# Patient Record
Sex: MALE | Race: WHITE | NOT HISPANIC OR LATINO | URBAN - METROPOLITAN AREA
[De-identification: names, ages, dates, MRNs, and addresses within clinical notes are randomized per-mention and may not be internally consistent; named-entity substitution may affect disease eponyms.]

---

## 2018-05-07 PROBLEM — Z00.00 ENCOUNTER FOR PREVENTIVE HEALTH EXAMINATION: Status: ACTIVE | Noted: 2018-05-07

## 2019-01-15 ENCOUNTER — EMERGENCY (EMERGENCY)
Facility: HOSPITAL | Age: 39
LOS: 1 days | Discharge: HOME | End: 2019-01-15
Attending: EMERGENCY MEDICINE
Payer: COMMERCIAL

## 2019-01-15 VITALS
TEMPERATURE: 98 F | RESPIRATION RATE: 18 BRPM | DIASTOLIC BLOOD PRESSURE: 60 MMHG | SYSTOLIC BLOOD PRESSURE: 112 MMHG | HEART RATE: 62 BPM | OXYGEN SATURATION: 100 %

## 2019-01-15 VITALS
TEMPERATURE: 98 F | WEIGHT: 259.93 LBS | HEART RATE: 73 BPM | DIASTOLIC BLOOD PRESSURE: 89 MMHG | OXYGEN SATURATION: 99 % | RESPIRATION RATE: 18 BRPM | SYSTOLIC BLOOD PRESSURE: 135 MMHG | HEIGHT: 74 IN

## 2019-01-15 LAB
ALBUMIN SERPL ELPH-MCNC: 4.8 G/DL — SIGNIFICANT CHANGE UP (ref 3.3–5)
ALP SERPL-CCNC: 66 U/L — SIGNIFICANT CHANGE UP (ref 40–120)
ALT FLD-CCNC: 44 U/L — SIGNIFICANT CHANGE UP (ref 10–45)
ANION GAP SERPL CALC-SCNC: 14 MMOL/L — SIGNIFICANT CHANGE UP (ref 5–17)
APTT BLD: 31.9 SEC — SIGNIFICANT CHANGE UP (ref 27.5–36.3)
AST SERPL-CCNC: 26 U/L — SIGNIFICANT CHANGE UP (ref 10–40)
BASOPHILS # BLD AUTO: 0.1 K/UL — SIGNIFICANT CHANGE UP (ref 0–0.2)
BASOPHILS # BLD AUTO: 0.1 K/UL — SIGNIFICANT CHANGE UP (ref 0–0.2)
BASOPHILS NFR BLD AUTO: 0.7 % — SIGNIFICANT CHANGE UP (ref 0–2)
BASOPHILS NFR BLD AUTO: 0.9 % — SIGNIFICANT CHANGE UP (ref 0–2)
BILIRUB SERPL-MCNC: 0.3 MG/DL — SIGNIFICANT CHANGE UP (ref 0.2–1.2)
BUN SERPL-MCNC: 14 MG/DL — SIGNIFICANT CHANGE UP (ref 7–23)
CALCIUM SERPL-MCNC: 9.9 MG/DL — SIGNIFICANT CHANGE UP (ref 8.4–10.5)
CHLORIDE SERPL-SCNC: 97 MMOL/L — SIGNIFICANT CHANGE UP (ref 96–108)
CO2 SERPL-SCNC: 26 MMOL/L — SIGNIFICANT CHANGE UP (ref 22–31)
CREAT SERPL-MCNC: 0.98 MG/DL — SIGNIFICANT CHANGE UP (ref 0.5–1.3)
EOSINOPHIL # BLD AUTO: 0.2 K/UL — SIGNIFICANT CHANGE UP (ref 0–0.5)
EOSINOPHIL # BLD AUTO: 0.2 K/UL — SIGNIFICANT CHANGE UP (ref 0–0.5)
EOSINOPHIL NFR BLD AUTO: 2 % — SIGNIFICANT CHANGE UP (ref 0–6)
EOSINOPHIL NFR BLD AUTO: 2 % — SIGNIFICANT CHANGE UP (ref 0–6)
GLUCOSE SERPL-MCNC: 118 MG/DL — HIGH (ref 70–99)
HCT VFR BLD CALC: 47.7 % — SIGNIFICANT CHANGE UP (ref 39–50)
HCT VFR BLD CALC: 48.1 % — SIGNIFICANT CHANGE UP (ref 39–50)
HGB BLD-MCNC: 16.5 G/DL — SIGNIFICANT CHANGE UP (ref 13–17)
HGB BLD-MCNC: 16.5 G/DL — SIGNIFICANT CHANGE UP (ref 13–17)
INR BLD: 0.97 RATIO — SIGNIFICANT CHANGE UP (ref 0.88–1.16)
LYMPHOCYTES # BLD AUTO: 2.6 K/UL — SIGNIFICANT CHANGE UP (ref 1–3.3)
LYMPHOCYTES # BLD AUTO: 23.3 % — SIGNIFICANT CHANGE UP (ref 13–44)
LYMPHOCYTES # BLD AUTO: 26.5 % — SIGNIFICANT CHANGE UP (ref 13–44)
LYMPHOCYTES # BLD AUTO: 3 K/UL — SIGNIFICANT CHANGE UP (ref 1–3.3)
MCHC RBC-ENTMCNC: 31.1 PG — SIGNIFICANT CHANGE UP (ref 27–34)
MCHC RBC-ENTMCNC: 31.2 PG — SIGNIFICANT CHANGE UP (ref 27–34)
MCHC RBC-ENTMCNC: 34.4 GM/DL — SIGNIFICANT CHANGE UP (ref 32–36)
MCHC RBC-ENTMCNC: 34.5 GM/DL — SIGNIFICANT CHANGE UP (ref 32–36)
MCV RBC AUTO: 90.2 FL — SIGNIFICANT CHANGE UP (ref 80–100)
MCV RBC AUTO: 90.4 FL — SIGNIFICANT CHANGE UP (ref 80–100)
MONOCYTES # BLD AUTO: 0.7 K/UL — SIGNIFICANT CHANGE UP (ref 0–0.9)
MONOCYTES # BLD AUTO: 0.8 K/UL — SIGNIFICANT CHANGE UP (ref 0–0.9)
MONOCYTES NFR BLD AUTO: 6 % — SIGNIFICANT CHANGE UP (ref 2–14)
MONOCYTES NFR BLD AUTO: 6.7 % — SIGNIFICANT CHANGE UP (ref 2–14)
NEUTROPHILS # BLD AUTO: 7.2 K/UL — SIGNIFICANT CHANGE UP (ref 1.8–7.4)
NEUTROPHILS # BLD AUTO: 7.7 K/UL — HIGH (ref 1.8–7.4)
NEUTROPHILS NFR BLD AUTO: 63.9 % — SIGNIFICANT CHANGE UP (ref 43–77)
NEUTROPHILS NFR BLD AUTO: 68 % — SIGNIFICANT CHANGE UP (ref 43–77)
PLATELET # BLD AUTO: 170 K/UL — SIGNIFICANT CHANGE UP (ref 150–400)
PLATELET # BLD AUTO: ABNORMAL (ref 150–400)
POTASSIUM SERPL-MCNC: 4.2 MMOL/L — SIGNIFICANT CHANGE UP (ref 3.5–5.3)
POTASSIUM SERPL-SCNC: 4.2 MMOL/L — SIGNIFICANT CHANGE UP (ref 3.5–5.3)
PROT SERPL-MCNC: 8.1 G/DL — SIGNIFICANT CHANGE UP (ref 6–8.3)
PROTHROM AB SERPL-ACNC: 11 SEC — SIGNIFICANT CHANGE UP (ref 10–12.9)
RBC # BLD: 5.29 M/UL — SIGNIFICANT CHANGE UP (ref 4.2–5.8)
RBC # BLD: 5.32 M/UL — SIGNIFICANT CHANGE UP (ref 4.2–5.8)
RBC # FLD: 12.9 % — SIGNIFICANT CHANGE UP (ref 10.3–14.5)
RBC # FLD: 13.1 % — SIGNIFICANT CHANGE UP (ref 10.3–14.5)
SODIUM SERPL-SCNC: 137 MMOL/L — SIGNIFICANT CHANGE UP (ref 135–145)
WBC # BLD: 11.3 K/UL — HIGH (ref 3.8–10.5)
WBC # BLD: 11.3 K/UL — HIGH (ref 3.8–10.5)
WBC # FLD AUTO: 11.3 K/UL — HIGH (ref 3.8–10.5)
WBC # FLD AUTO: 11.3 K/UL — HIGH (ref 3.8–10.5)

## 2019-01-15 PROCEDURE — 96374 THER/PROPH/DIAG INJ IV PUSH: CPT | Mod: XU

## 2019-01-15 PROCEDURE — 85730 THROMBOPLASTIN TIME PARTIAL: CPT

## 2019-01-15 PROCEDURE — 99284 EMERGENCY DEPT VISIT MOD MDM: CPT | Mod: 25

## 2019-01-15 PROCEDURE — 85027 COMPLETE CBC AUTOMATED: CPT

## 2019-01-15 PROCEDURE — 96375 TX/PRO/DX INJ NEW DRUG ADDON: CPT | Mod: XU

## 2019-01-15 PROCEDURE — 99284 EMERGENCY DEPT VISIT MOD MDM: CPT

## 2019-01-15 PROCEDURE — 70450 CT HEAD/BRAIN W/O DYE: CPT

## 2019-01-15 PROCEDURE — 85610 PROTHROMBIN TIME: CPT

## 2019-01-15 PROCEDURE — 70450 CT HEAD/BRAIN W/O DYE: CPT | Mod: 26

## 2019-01-15 PROCEDURE — 80053 COMPREHEN METABOLIC PANEL: CPT

## 2019-01-15 RX ORDER — ACETAMINOPHEN 500 MG
1000 TABLET ORAL ONCE
Qty: 0 | Refills: 0 | Status: COMPLETED | OUTPATIENT
Start: 2019-01-15 | End: 2019-01-15

## 2019-01-15 RX ORDER — SODIUM CHLORIDE 9 MG/ML
1000 INJECTION INTRAMUSCULAR; INTRAVENOUS; SUBCUTANEOUS ONCE
Qty: 0 | Refills: 0 | Status: COMPLETED | OUTPATIENT
Start: 2019-01-15 | End: 2019-01-15

## 2019-01-15 RX ORDER — METOCLOPRAMIDE HCL 10 MG
10 TABLET ORAL ONCE
Qty: 0 | Refills: 0 | Status: COMPLETED | OUTPATIENT
Start: 2019-01-15 | End: 2019-01-15

## 2019-01-15 RX ADMIN — Medication 10 MILLIGRAM(S): at 19:10

## 2019-01-15 RX ADMIN — Medication 400 MILLIGRAM(S): at 19:10

## 2019-01-15 RX ADMIN — SODIUM CHLORIDE 1000 MILLILITER(S): 9 INJECTION INTRAMUSCULAR; INTRAVENOUS; SUBCUTANEOUS at 19:17

## 2019-01-15 NOTE — ED ADULT TRIAGE NOTE - CHIEF COMPLAINT QUOTE
Headaches x 4 weeks, "my head feels like I'm going to explode," dizziness, n/v, diagnosed on 01/05 with swelling to right ventricle of brain. Denies blurred vision.

## 2019-01-15 NOTE — ED PROVIDER NOTE - NSFOLLOWUPINSTRUCTIONS_ED_ALL_ED_FT
1) You were here for headache and weakness.    2) Take your medicine as prescribed.   3) Follow up with your the neurologist in 1-3 days for evaluation and MRI and to answer any questions you have.    4) Return to the emergency department if you experience worsening symptoms, pain, fever, chills, nausea, vomiting or other concerning symptoms.

## 2019-01-15 NOTE — ED ADULT NURSE NOTE - OBJECTIVE STATEMENT
39 yo M aaox4 c/o of headache. PMH R ventricular valve swelling found in CT scan done at other hospital. Early January of this year.  Pt c/o of 2 wks of intermittent headache. Pt prescribed steroids and oxycodone at home. Reports today had new onset of dizziness,  HA, Right sided numbness and weakness. Speech Clear. Pt has symmetrical strength and sensation with no paresthesia. Pt pupils are PERRL and gag reflex intact. Behaviour appropriate to situation. Labs drawn medication given as ordered. Safety and support provided.

## 2019-01-15 NOTE — ED PROVIDER NOTE - PROGRESS NOTE DETAILS
Patient wishes to seek outapatient neuro evaluation and mri.  aware of risk.  neuro saw patient and ok with plan.

## 2019-01-15 NOTE — ED ADULT NURSE NOTE - CHPI ED NUR SYMPTOMS NEG
no change in level of consciousness/no confusion/no loss of consciousness/no weakness/no vomiting/no nausea/no numbness

## 2019-01-15 NOTE — ED ADULT NURSE NOTE - NSIMPLEMENTINTERV_GEN_ALL_ED
Implemented All Fall Risk Interventions:  [place] to call system. Call bell, personal items and telephone within reach. Instruct patient to call for assistance. Room bathroom lighting operational. Non-slip footwear when patient is off stretcher. Physically safe environment: no spills, clutter or unnecessary equipment. Stretcher in lowest position, wheels locked, appropriate side rails in place. Provide visual cue, wrist band, yellow gown, etc. Monitor gait and stability. Monitor for mental status changes and reorient to person, place, and time. Review medications for side effects contributing to fall risk. Reinforce activity limits and safety measures with patient and family.

## 2019-01-15 NOTE — ED PROVIDER NOTE - MEDICAL DECISION MAKING DETAILS
Pt with recurrent headaches with moderate severity had abnormal findings on previous CT scan and had normal MRI pt had normal neurological exam no signs of meningeal irritation Repeat CT head again showed narrowed left lateral ventricle without surrounding edema no new interval changes likely congenital needs follow up Pt suggested to have an MRV but pt.  insists to have it done outpatient --Mccray

## 2019-01-15 NOTE — CONSULT NOTE ADULT - SUBJECTIVE AND OBJECTIVE BOX
Neurology Consult    Reason for consult: Headache    HPI:  38y Male no PMH p/w 2 weeks intermittent headache. As per patient, has had headaches, 10/10 in intensity,     REVIEW OF SYSTEMS:  Constitutional: No fever, chills, fatigue, weakness.  Eyes: No eye pain, visual disturbances, or discharge.  ENT:  No difficulty hearing, tinnitus, vertigo. No sinus or throat pain.  Neck: No pain or stiffness.  Respiratory: No cough, dyspnea, wheezing.  Cardiovascular: No chest pain, palpitations.  Gastrointestinal: No abdominal pain. No nausea, vomiting, diarrhea, or constipation.   Genitourinary: No dysuria, frequency, hematuria or incontinence.  Neurological: No headaches, lightheadedness, vertigo, numbness or tremors.  Psychiatric: No depression, anxiety, mood swings, or difficulty sleeping.  Musculoskeletal: No joint pain or swelling. No muscle, back, or extremity pain.  Skin: No itching, burning, rashes or lesions.   Lymph Nodes: No enlarged glands  Endocrine: No heat or cold intolerance, no hair loss.  Allergy and Immunologic: No hives or eczema.    MEDICATIONS      PMH: No pertinent past medical history       PSH: No significant past surgical history      FAMILY HISTORY:  No pertinent family history in first degree relatives    No history of dementia, strokes, or seizures     SOCIAL HISTORY:  No history of tobacco or alcohol use     Allergies    No Known Allergies    Intolerances        Vital Signs Last 24 Hrs  T(C): 36.7 (15 Perico 2019 19:40), Max: 36.7 (15 Perico 2019 19:40)  T(F): 98 (15 Perico 2019 19:40), Max: 98 (15 Perico 2019 19:40)  HR: 62 (15 Perico 2019 19:40) (62 - 73)  BP: 112/60 (15 Perico 2019 19:40) (112/60 - 135/89)  BP(mean): --  RR: 18 (15 Perico 2019 19:40) (18 - 18)  SpO2: 100% (15 Perico 2019 19:40) (99% - 100%)    Neurological Examination:    Mental Status: Patient is alert, awake, oriented X3. Patient is fluent, no dysarthria, no aphasia. Follows commands well and able to answer questions appropriately. Mood and affect normal.    Cranial Nerves: PERRL, EOMI, visual field intact, V1-V3 intact, no gross facial asymmetry, tongue/uvula midline    Motor Exam: No drift  Right upper extremity: 5/5  Left upper extremity: 5/5  Right lower extremity: 5/5  Left lower extremity: 5/5    Normal bulk/tone    Sensory: Intact to light touch and pinprick bilaterally. No extinction    Coordination: Finger to nose intact bilaterally     Gait: Normal      Reflexes: Bilateral 2+ Biceps, Brachial, Patellar, Ankle  Plantar: Down bilateral    GENERAL: No acute distress  HEENT:  Normocephalic, atraumatic  EXTREMITIES: No edema, clubbing, cyanosis  MUSCULOSKELETAL: Normal range of motion  SKIN: No rashes    LABS:  CBC Full  -  ( 15 Perico 2019 18:57 )  WBC Count : 11.3 K/uL  Hemoglobin : 16.5 g/dL  Hematocrit : 48.1 %  Platelet Count - Automated : CLUMPED  Mean Cell Volume : 90.4 fl  Mean Cell Hemoglobin : 31.1 pg  Mean Cell Hemoglobin Concentration : 34.4 gm/dL  Auto Neutrophil # : 7.7 K/uL  Auto Lymphocyte # : 2.6 K/uL  Auto Monocyte # : 0.7 K/uL  Auto Eosinophil # : 0.2 K/uL  Auto Basophil # : 0.1 K/uL  Auto Neutrophil % : 68.0 %  Auto Lymphocyte % : 23.3 %  Auto Monocyte % : 6.0 %  Auto Eosinophil % : 2.0 %  Auto Basophil % : 0.7 %      01-15    137  |  97  |  14  ----------------------------<  118<H>  4.2   |  26  |  0.98    Ca    9.9      15 Perico 2019 18:57    TPro  8.1  /  Alb  4.8  /  TBili  0.3  /  DBili  x   /  AST  26  /  ALT  44  /  AlkPhos  66  01-15    LIVER FUNCTIONS - ( 15 Perico 2019 18:57 )  Alb: 4.8 g/dL / Pro: 8.1 g/dL / ALK PHOS: 66 U/L / ALT: 44 U/L / AST: 26 U/L / GGT: x           Hemoglobin A1C:       PT/INR - ( 15 Perico 2019 18:57 )   PT: 11.0 sec;   INR: 0.97 ratio         PTT - ( 15 Perico 2019 18:57 )  PTT:31.9 sec    RADIOLOGY:  CT head:  MRI: Neurology Consult    Reason for consult: Headache    HPI:  38y Male no PMH p/w 2 weeks intermittent headache. As per patient, has had headaches, 10/10 in intensity, starts posteriorly in the head and spreads to anteriorly, described as a pressure-like sensation. Comes with nausea/vomiting(2x day), also reports transient L side weakness which only occurs during headache. Has had multiple CTs and MRI scans at other centers. Denies blurred vision, sensory deficits, dysarthria, dysphagia.     REVIEW OF SYSTEMS:  As above    MEDICATIONS    PMH: No pertinent past medical history    PSH: No significant past surgical history    FAMILY HISTORY:  No pertinent family history in first degree relatives    No history of dementia, strokes, or seizures     SOCIAL HISTORY:  No history of tobacco or alcohol use     Allergies    No Known Allergies    Intolerances    Vital Signs Last 24 Hrs  T(C): 36.7 (15 Perico 2019 19:40), Max: 36.7 (15 Perico 2019 19:40)  T(F): 98 (15 Perico 2019 19:40), Max: 98 (15 Perico 2019 19:40)  HR: 62 (15 Perico 2019 19:40) (62 - 73)  BP: 112/60 (15 Perico 2019 19:40) (112/60 - 135/89)  BP(mean): --  RR: 18 (15 Perico 2019 19:40) (18 - 18)  SpO2: 100% (15 Perico 2019 19:40) (99% - 100%)    Neurological Examination:    Mental Status: Patient is alert, awake, oriented X3. Patient is fluent, no dysarthria, no aphasia. Follows commands well and able to answer questions appropriately.     Cranial Nerves: PERRL, EOMI, visual field intact, snellen 20/20 bilaterally, fundus could not be appreciated, V1-V3 intact, no gross facial asymmetry, tongue/uvula midline    Motor Exam: No drift  Right upper extremity: 5/5  Left upper extremity: 5/5  Right lower extremity: 5/5  Left lower extremity: 5/5    Normal bulk/tone    Sensory: Intact to light touch bilaterally. No extinction    Coordination: Finger to nose intact bilaterally     Gait: Normal      Reflexes: Bilateral 2+ Biceps, Brachial, Patellar, Ankle  Plantar: Down bilateral    GENERAL: No acute distress  HEENT:  Normocephalic, atraumatic  EXTREMITIES: No edema, clubbing, cyanosis  MUSCULOSKELETAL: Normal range of motion  SKIN: No rashes    LABS:  CBC Full  -  ( 15 Perico 2019 18:57 )  WBC Count : 11.3 K/uL  Hemoglobin : 16.5 g/dL  Hematocrit : 48.1 %  Platelet Count - Automated : CLUMPED  Mean Cell Volume : 90.4 fl  Mean Cell Hemoglobin : 31.1 pg  Mean Cell Hemoglobin Concentration : 34.4 gm/dL  Auto Neutrophil # : 7.7 K/uL  Auto Lymphocyte # : 2.6 K/uL  Auto Monocyte # : 0.7 K/uL  Auto Eosinophil # : 0.2 K/uL  Auto Basophil # : 0.1 K/uL  Auto Neutrophil % : 68.0 %  Auto Lymphocyte % : 23.3 %  Auto Monocyte % : 6.0 %  Auto Eosinophil % : 2.0 %  Auto Basophil % : 0.7 %      01-15    137  |  97  |  14  ----------------------------<  118<H>  4.2   |  26  |  0.98    Ca    9.9      15 Perico 2019 18:57    TPro  8.1  /  Alb  4.8  /  TBili  0.3  /  DBili  x   /  AST  26  /  ALT  44  /  AlkPhos  66  01-15    LIVER FUNCTIONS - ( 15 Perico 2019 18:57 )  Alb: 4.8 g/dL / Pro: 8.1 g/dL / ALK PHOS: 66 U/L / ALT: 44 U/L / AST: 26 U/L / GGT: x           Hemoglobin A1C:       PT/INR - ( 15 Perico 2019 18:57 )   PT: 11.0 sec;   INR: 0.97 ratio         PTT - ( 15 Perico 2019 18:57 )  PTT:31.9 sec    RADIOLOGY:  CT head:  MRI:

## 2019-01-15 NOTE — ED PROVIDER NOTE - PHYSICAL EXAMINATION
Marianela Weems M.D.:   patient awake alert NAD .   LUNGS CTAB no wheeze no crackle.   CARD RRR no m/r/g.    Abdomen soft NT ND no rebound no guarding no CVA tenderness.   EXT WWP no edema no calf tenderness CV 2+DP/PT bilaterally.   neuro A&Ox3 subjective right sided numbness in face arm and leg. 4/5 strength in RUE and RLE.  difficulty with looking left but otherwise cns intact. unsteady gait.    skin warm and dry no rash  HEENT: moist mucous membranes, PERRL, EOMI Marianela Weems M.D.:   patient awake alert NAD .   LUNGS CTAB no wheeze no crackle.   CARD RRR no m/r/g.    Abdomen soft NT ND no rebound no guarding no CVA tenderness.   EXT WWP no edema no calf tenderness CV 2+DP/PT bilaterally.   neuro A&Ox3 subjective right sided numbness in face arm and leg. 4/5 strength in RUE and RLE.  difficulty with looking left but otherwise cns intact. unsteady gait.    skin warm and dry no rash  HEENT: moist mucous membranes, PERRL, EOMI              Dr Mahendra chen --

## 2019-01-15 NOTE — CONSULT NOTE ADULT - ASSESSMENT
38y Male no PMH p/w 2 weeks intermittent headache. CT head w/o contrast w/ asymmetrical ventricular appearance may represent normal anatomic variation. Given persistence of headache, could consider vessel imaging to rule out venous sinus thrombosis; however, given that patient with multiple other scans done at other centers; would recommend obtaining additional records with regards to patient's previous workup.    Recs:  Obtain previous workup  Would consider MRI/MRV head if unable to obtain workup with regards to previous vascular imaging  Migraine cocktail(Toradol/reglan/benadryl)

## 2019-01-15 NOTE — ED PROVIDER NOTE - NSFOLLOWUPCLINICS_GEN_ALL_ED_FT
[facility]  Neurology  [address] [place] [address] [address] [place] [address]  Phone: [phone]  Fax:   Follow Up Time: 1-3 Days

## 2019-01-15 NOTE — ED PROVIDER NOTE - OBJECTIVE STATEMENT
38M no pmh p/w 2 weeks of itnermittent headache and vomiting acutely worse today now assocaited with right sided numbness/weakness ataxia and blurry vision. had ct and mri which showed enalrged right ventricle has had multiple hospital stays for this in the last 2 weeks (at [facility]). today the symptoms are worse and the numbness is new which prompted visit. no f/c no headaches no abd pain. no recent illness. 38M no pmh p/w 2 weeks of intermittent headache and vomiting acutely worse today now assocaited with right sided numbness/weakness ataxia and blurry vision. had ct and mri which showed enalrged right ventricle has had multiple hospital stays for this in the last 2 weeks (at [facility]). today the symptoms are worse and the numbness is new which prompted visit. no f/c no headaches no abd pain. no recent illness.

## 2019-01-15 NOTE — ED CLERICAL - NS ED CLERK NOTE PRE-ARRIVAL INFORMATION; ADDITIONAL PRE-ARRIVAL INFORMATION
CC/Reason For referral: Severe Headache, Gait Ataxia, Vision Problems  Preferred Consultant(if applicable): Neuro  Who admits for you (if needed): N/A  Do you have documents you would like to fax over? No, sent with patient  Would you still like to speak to an ED attending? Yes, call Dr Garcia [phone] once patient is seen

## 2019-01-29 ENCOUNTER — APPOINTMENT (OUTPATIENT)
Dept: CARDIOLOGY | Facility: CLINIC | Age: 39
End: 2019-01-29
Payer: COMMERCIAL

## 2019-01-29 ENCOUNTER — NON-APPOINTMENT (OUTPATIENT)
Age: 39
End: 2019-01-29

## 2019-01-29 VITALS
HEIGHT: 74 IN | WEIGHT: 270 LBS | BODY MASS INDEX: 34.65 KG/M2 | HEART RATE: 80 BPM | DIASTOLIC BLOOD PRESSURE: 90 MMHG | RESPIRATION RATE: 14 BRPM | SYSTOLIC BLOOD PRESSURE: 148 MMHG

## 2019-01-29 DIAGNOSIS — R51 HEADACHE: ICD-10-CM

## 2019-01-29 DIAGNOSIS — G93.89 OTHER SPECIFIED DISORDERS OF BRAIN: ICD-10-CM

## 2019-01-29 PROCEDURE — 99244 OFF/OP CNSLTJ NEW/EST MOD 40: CPT

## 2019-01-29 PROCEDURE — 93000 ELECTROCARDIOGRAM COMPLETE: CPT

## 2019-03-08 ENCOUNTER — APPOINTMENT (OUTPATIENT)
Dept: CARDIOLOGY | Facility: CLINIC | Age: 39
End: 2019-03-08

## 2019-05-10 ENCOUNTER — APPOINTMENT (OUTPATIENT)
Dept: CARDIOLOGY | Facility: CLINIC | Age: 39
End: 2019-05-10

## 2022-03-16 ENCOUNTER — APPOINTMENT (OUTPATIENT)
Dept: CARDIOLOGY | Facility: CLINIC | Age: 42
End: 2022-03-16
Payer: COMMERCIAL

## 2022-03-16 ENCOUNTER — NON-APPOINTMENT (OUTPATIENT)
Age: 42
End: 2022-03-16

## 2022-03-16 VITALS
SYSTOLIC BLOOD PRESSURE: 122 MMHG | DIASTOLIC BLOOD PRESSURE: 80 MMHG | WEIGHT: 315 LBS | BODY MASS INDEX: 40.43 KG/M2 | HEIGHT: 74 IN | HEART RATE: 70 BPM | RESPIRATION RATE: 16 BRPM

## 2022-03-16 DIAGNOSIS — R60.0 LOCALIZED EDEMA: ICD-10-CM

## 2022-03-16 DIAGNOSIS — R06.00 DYSPNEA, UNSPECIFIED: ICD-10-CM

## 2022-03-16 PROCEDURE — 93000 ELECTROCARDIOGRAM COMPLETE: CPT

## 2022-03-16 PROCEDURE — 99214 OFFICE O/P EST MOD 30 MIN: CPT

## 2022-03-16 RX ORDER — METHYLPREDNISOLONE 4 MG/1
4 TABLET ORAL
Refills: 0 | Status: DISCONTINUED | COMMUNITY
End: 2022-03-16

## 2022-03-16 RX ORDER — ONDANSETRON 8 MG/1
8 TABLET, ORALLY DISINTEGRATING ORAL
Refills: 0 | Status: DISCONTINUED | COMMUNITY
End: 2022-03-16

## 2022-03-16 RX ORDER — TOPIRAMATE 25 MG
CAPSULE, SPRINKLE ORAL
Refills: 0 | Status: DISCONTINUED | COMMUNITY
End: 2022-03-16

## 2022-03-16 RX ORDER — GABAPENTIN 300 MG
300 TABLET ORAL 3 TIMES DAILY
Refills: 0 | Status: DISCONTINUED | COMMUNITY
End: 2022-03-16

## 2022-03-16 RX ORDER — OXYCODONE HYDROCHLORIDE AND ACETAMINOPHEN 10; 325 MG/1; MG/1
10-325 TABLET ORAL
Refills: 0 | Status: ACTIVE | COMMUNITY

## 2022-03-16 RX ORDER — PANTOPRAZOLE 20 MG/1
20 TABLET, DELAYED RELEASE ORAL
Refills: 0 | Status: DISCONTINUED | COMMUNITY
End: 2022-03-16

## 2022-03-16 NOTE — ASSESSMENT
[FreeTextEntry1] : ECG: SR, rightward axis, NSST\par \par \par CARDIAC CTA 3/2022:\par 1. Calcium score = 10\par 2. Mild non-calcified plaque in LAD (25-49%)\par 3. Mild non-calcified plaque in the first diagonal (25-49%)\par 4. Moderate mixed plaque in the RCA (50-69%)\par 5. Left dominant system

## 2022-03-16 NOTE — DISCUSSION/SUMMARY
[FreeTextEntry1] : Patient is a 40yo M with obseity, former smoker, family history of CAD here for hospital follow up. \par -CTA with low calcium score but evidence of non calcified plaque in LAD/Diag and mixed in RCA\par -Disease in RCA moderate, ? contributing to symptoms. Needs ASA/statin and will arrange functional testing to further evaluate significance of disease and if contributing to symptoms. NEeds echo as well\par \par 1. Echo and nuclear stress testing to evaluate FAJARDO/CP/CAD on CTA\par 2. Continue ASA/statin\par 3. Aggressive risk factor modification, counselled on diet/weight loss. PAtient motivated\par 4. BP controlled, no DM\par 5. Follow up after testing \par 6. Regular PMD follow up

## 2022-03-16 NOTE — PHYSICAL EXAM
[Normal] : clear lung fields, good air entry, no respiratory distress [Soft] : abdomen soft [Normal Gait] : normal gait [Moves all extremities] : moves all extremities [Alert and Oriented] : alert and oriented [Edema ___] : edema [unfilled] [de-identified] : obese

## 2022-03-16 NOTE — HISTORY OF PRESENT ILLNESS
[FreeTextEntry1] : Patient is a 42yo M with obesity, former smoker, family history of CAD here for hospital follow up. Went in with chest pain, had negative trops and CTA showing mild CAD with CAC of 10, mild disease in LAD/Diag and 50-69% RCA disease. Pain started in middle of chest at work, took tums with no relief. Then went home later, developed pain shooting down left arm. Laid down and pain continued so went to ED. Still with pain in arm and chest but improved. Has some exertional CP/SOB as well, when goes up stairs will feel it. Walks somewhat regularly and takes dog out. Wants to lose weight again and making dietary changes, weight has been up/down. Gets episodes diaphoresis not related to CP/SOB, occurs at night. Snores at night as well. Gets tired during the day. Patient denies PND/orthopnea/edema/palpitations/syncope/claudication \par \par Engaged and has 4 month old son. \par \par ROS: GI and  negative

## 2022-04-01 ENCOUNTER — APPOINTMENT (OUTPATIENT)
Dept: CARDIOLOGY | Facility: CLINIC | Age: 42
End: 2022-04-01
Payer: COMMERCIAL

## 2022-04-01 PROCEDURE — 78452 HT MUSCLE IMAGE SPECT MULT: CPT

## 2022-04-01 PROCEDURE — 93015 CV STRESS TEST SUPVJ I&R: CPT

## 2022-04-01 PROCEDURE — A9500: CPT

## 2022-04-05 ENCOUNTER — APPOINTMENT (OUTPATIENT)
Dept: CARDIOLOGY | Facility: CLINIC | Age: 42
End: 2022-04-05

## 2022-04-18 ENCOUNTER — APPOINTMENT (OUTPATIENT)
Dept: CARDIOLOGY | Facility: CLINIC | Age: 42
End: 2022-04-18
Payer: COMMERCIAL

## 2022-04-18 PROCEDURE — 93306 TTE W/DOPPLER COMPLETE: CPT

## 2022-04-18 RX ADMIN — PERFLUTREN MG/ML: 6.52 INJECTION, SUSPENSION INTRAVENOUS at 00:00

## 2022-04-19 RX ORDER — PERFLUTREN 6.52 MG/ML
6.52 INJECTION, SUSPENSION INTRAVENOUS
Qty: 2 | Refills: 0 | Status: COMPLETED | OUTPATIENT
Start: 2022-04-18

## 2022-05-06 ENCOUNTER — NON-APPOINTMENT (OUTPATIENT)
Age: 42
End: 2022-05-06

## 2022-06-24 ENCOUNTER — NON-APPOINTMENT (OUTPATIENT)
Age: 42
End: 2022-06-24

## 2022-06-24 ENCOUNTER — APPOINTMENT (OUTPATIENT)
Dept: CARDIOLOGY | Facility: CLINIC | Age: 42
End: 2022-06-24
Payer: COMMERCIAL

## 2022-06-24 VITALS
WEIGHT: 315 LBS | SYSTOLIC BLOOD PRESSURE: 120 MMHG | RESPIRATION RATE: 16 BRPM | HEART RATE: 72 BPM | DIASTOLIC BLOOD PRESSURE: 88 MMHG | HEIGHT: 74 IN | BODY MASS INDEX: 40.43 KG/M2

## 2022-06-24 DIAGNOSIS — I25.10 ATHEROSCLEROTIC HEART DISEASE OF NATIVE CORONARY ARTERY W/OUT ANGINA PECTORIS: ICD-10-CM

## 2022-06-24 DIAGNOSIS — Z82.49 FAMILY HISTORY OF ISCHEMIC HEART DISEASE AND OTHER DISEASES OF THE CIRCULATORY SYSTEM: ICD-10-CM

## 2022-06-24 PROCEDURE — 93000 ELECTROCARDIOGRAM COMPLETE: CPT

## 2022-06-24 PROCEDURE — 99214 OFFICE O/P EST MOD 30 MIN: CPT

## 2022-06-24 RX ORDER — LEVOTHYROXINE SODIUM 0.09 MG/1
88 TABLET ORAL
Qty: 90 | Refills: 0 | Status: ACTIVE | COMMUNITY
Start: 2022-06-06

## 2022-06-24 RX ORDER — IBUPROFEN 600 MG/1
600 TABLET, FILM COATED ORAL
Qty: 12 | Refills: 0 | Status: ACTIVE | COMMUNITY
Start: 2022-06-09

## 2022-06-24 NOTE — HISTORY OF PRESENT ILLNESS
[FreeTextEntry1] : Patient is a 40yo M with obesity, former smoker, family history of CAD here for hospital follow up. Was in hospital earlier this year, had negative trops and CTA showing mild CAD with CAC of 10, mild disease in LAD/Diag and 50-69% RCA disease.  Has now undergone echo and nuclear stress testing. Currently without exertional  CP/SOB.Only CP when stressed but mild. Patient denies PND/orthopnea/edema/palpitations/syncope/claudication. Has improved diet and walking more. On antibiotics for recent tooth extraction\par \par Engaged and has 7 month old son. \par \par ROS: GI and  negative

## 2022-06-24 NOTE — PHYSICAL EXAM
[Normal] : clear lung fields, good air entry, no respiratory distress [Soft] : abdomen soft [Normal Gait] : normal gait [Edema ___] : edema [unfilled] [Moves all extremities] : moves all extremities [Alert and Oriented] : alert and oriented [de-identified] : obese

## 2022-06-24 NOTE — ASSESSMENT
[FreeTextEntry1] : ECG: SR, limb lead reversal\par Repeat ECG: SR, low voltage, borderline IVCD, rightward axis\par \par CARDIAC CTA 3/2022:\par 1. Calcium score = 10\par 2. Mild non-calcified plaque in LAD (25-49%)\par 3. Mild non-calcified plaque in the first diagonal (25-49%)\par 4. Moderate mixed plaque in the RCA (50-69%)\par 5. Left dominant system \par \par EXERCISE NUCLEAR STRESS TEST 4/2022:\par 1. Negative for ischemia, EF 61%\par 2. Normal BP response\par 3. DTS = 10, achieved 11 METS \par \par ECHO 4/2022:\par 1. TDS\par 2. Contrast used\par 3. Normal LV size, systolic and diastolic function. EF 60-65%\par 4. Mild LAE\par 5. Normal RV/RA\par 5. Mild PI, trivial MR

## 2022-06-24 NOTE — DISCUSSION/SUMMARY
[FreeTextEntry1] : Patient is a 42yo M with obseity, former smoker, family history of CAD here for hospital follow up. \par -CTA with low calcium score but evidence of non calcified plaque in LAD/Diag and mixed in RCA\par -Disease in RCA moderate, negative stress test. continue ASA/statin \par -Negative nuclear stress test 4/2022, good functional capacity\par -Echo 3/2022 unremarkable , ECG unchanged today\par -Still mild CP with emotional stress, non-cardiac \par \par 1. CV stable at this time, continue aggressive risk factor modification. \par 2. Continue ASA/statin\par 3. Counselled on diet/weight loss. PAtient motivated\par 4. BP borderline, no meds at this time\par 5. Follow up 6 months to monitor progress with weight loss/exercise\par 6. Regular PMD follow up

## 2022-10-05 RX ORDER — KIT FOR THE PREPARATION OF TECHNETIUM TC99M SESTAMIBI 1 MG/5ML
INJECTION, POWDER, LYOPHILIZED, FOR SOLUTION PARENTERAL
Refills: 0 | Status: COMPLETED | OUTPATIENT
Start: 2022-10-05

## 2022-10-05 RX ADMIN — KIT FOR THE PREPARATION OF TECHNETIUM TC99M SESTAMIBI 0: 1 INJECTION, POWDER, LYOPHILIZED, FOR SOLUTION PARENTERAL at 00:00

## 2022-12-14 NOTE — HISTORY OF PRESENT ILLNESS
[FreeTextEntry1] : Patient is a 41yo M with obesity, former smoker, family history of CAD, non-obstructive CAD here for follow up. Was in hospital earlier this year, had negative trops and CTA showing mild CAD with CAC of 10, mild disease in LAD/Diag and 50-69% RCA disease.  Has now undergone echo and nuclear stress testing that were unremarkable\par \par . Currently without exertional  CP/SOB.Only CP when stressed but mild. Patient denies PND/orthopnea/edema/palpitations/syncope/claudication. Has improved diet and walking more. \par \par Engaged and has 2yo son. \par \par ROS: GI and  negative

## 2022-12-14 NOTE — DISCUSSION/SUMMARY
[FreeTextEntry1] : Patient is a 43yo M with obseity, former smoker, family history of CAD here for hospital follow up. \par -CTA with low calcium score but evidence of non calcified plaque in LAD/Diag and mixed in RCA\par -Disease in RCA moderate, negative stress test. continue ASA/statin \par -Negative nuclear stress test 4/2022, good functional capacity\par -Echo 3/2022 unremarkable , ECG unchanged today\par -Still mild CP with emotional stress, non-cardiac \par \par 1. CV stable at this time, continue aggressive risk factor modification. \par 2. Continue ASA/statin\par 3. Counselled on diet/weight loss. PAtient motivated\par 4. BP borderline, no meds at this time\par 5. \par 6.

## 2022-12-14 NOTE — PHYSICAL EXAM
[Normal] : clear lung fields, good air entry, no respiratory distress [Soft] : abdomen soft [Normal Gait] : normal gait [Edema ___] : edema [unfilled] [Moves all extremities] : moves all extremities [Alert and Oriented] : alert and oriented [de-identified] : obese

## 2022-12-15 ENCOUNTER — APPOINTMENT (OUTPATIENT)
Dept: CARDIOLOGY | Facility: CLINIC | Age: 42
End: 2022-12-15

## 2023-08-01 ENCOUNTER — NON-APPOINTMENT (OUTPATIENT)
Age: 43
End: 2023-08-01

## 2023-08-02 ENCOUNTER — APPOINTMENT (OUTPATIENT)
Dept: CARDIOLOGY | Facility: CLINIC | Age: 43
End: 2023-08-02

## 2023-08-04 ENCOUNTER — APPOINTMENT (OUTPATIENT)
Dept: CARDIOLOGY | Facility: CLINIC | Age: 43
End: 2023-08-04
Payer: COMMERCIAL

## 2023-08-04 VITALS
WEIGHT: 315 LBS | RESPIRATION RATE: 15 BRPM | BODY MASS INDEX: 40.43 KG/M2 | SYSTOLIC BLOOD PRESSURE: 114 MMHG | HEIGHT: 74 IN | DIASTOLIC BLOOD PRESSURE: 82 MMHG | OXYGEN SATURATION: 97 % | HEART RATE: 84 BPM

## 2023-08-04 DIAGNOSIS — R07.9 CHEST PAIN, UNSPECIFIED: ICD-10-CM

## 2023-08-04 PROCEDURE — 99214 OFFICE O/P EST MOD 30 MIN: CPT

## 2023-08-04 RX ORDER — AMOXICILLIN 875 MG/1
875 TABLET, FILM COATED ORAL
Qty: 14 | Refills: 0 | Status: DISCONTINUED | COMMUNITY
Start: 2022-06-09 | End: 2023-08-04

## 2023-08-04 RX ORDER — PREDNISONE 20 MG/1
20 TABLET ORAL
Qty: 5 | Refills: 0 | Status: DISCONTINUED | COMMUNITY
Start: 2022-05-31 | End: 2023-08-04

## 2023-08-04 RX ORDER — LEVOTHYROXINE SODIUM 88 UG/1
88 CAPSULE ORAL
Refills: 0 | Status: DISCONTINUED | COMMUNITY
End: 2023-08-04

## 2023-08-04 RX ORDER — DOXYCYCLINE HYCLATE 100 MG/1
100 TABLET ORAL
Qty: 28 | Refills: 0 | Status: DISCONTINUED | COMMUNITY
Start: 2022-05-31 | End: 2023-08-04

## 2023-08-04 NOTE — DISCUSSION/SUMMARY
[FreeTextEntry1] : Patient is a 43yo M with obseity, former smoker, family history of CAD, CAD here for hospital follow up.  -Recently with NSTEMI and s/p PCI to RCA with CHANDU 8/2023, Reolute [id number]. Trop I  peaked at 303 -Left dominant system -Echo 8/2023 with normal BiV function and no RWMA, no valve disease -Recovering well, BP controlled. Anxious but feeling better in general   1. Continue medical management of CAD , DAPT and high dose statin 2. Recommend aggressive diet and lifestyle modifications  3. AFter 3-4 weeks advise starting to increase exercise more and will need at least 30 minutes aerobic activity 5 days a week. Limited a bit by back pain and spinal fusion surgery  4. Will plan EST in a few weeks and refer to cardiac rehab 5. Follow up 2 months and check lipids then

## 2023-08-04 NOTE — HISTORY OF PRESENT ILLNESS
[FreeTextEntry1] : Patient is a 43yo M with obesity, former smoker, family history of CAD here for hospital follow up. Was in hospital 2022 year, had negative trops and CTA showing mild CAD with CAC of 10, mild disease in LAD/Diag and 50-69% RCA disease.  Had  undergone echo and nuclear stress testing that were negative then.    Had stopped his meds several months back. This week developed exertional CP that resolved with rest. Advised to go to ED and suffered small NSTEMI with trops to 308 as peak. Cath with 95% mRCA stenosis and underwent PCI with CHANDU. Pain initially started ove weekend after eating pizza so thought was heart burn. Then pain started worsening. Went to work but left early as not feeling well. Continued not to feel well and exeritonal CP as well.   Feeling better now, still with mild discomfort in chest. No dyspnea. Patient denies PND/orthopnea/edema/palpitations/syncope/claudication.    Engaged, has young son. Moving out of inlaws now. Works as  for nursing home.   ROS: GI and  negative

## 2023-08-04 NOTE — ASSESSMENT
[FreeTextEntry1] : ECG: SR, low voltage (from PMD office)   ECG: SR, limb lead reversal Repeat ECG: SR, low voltage, borderline IVCD, rightward axis  LHC 8/2023: 1. LM normal 2. pLAD 20%, mLAD 20% 3. LCx normal 4. mRCA 95% s/p CHANDU with one Reolute [id number] 5. Left dominant circulation   ECHO 8/2023 [facility]: 1. Normal LV size, systolic and diastolic function. EF 55-60%, no RWMA 2. Mild BRAVO 3. No clinically significant valvular disease   CARDIAC CTA 3/2022: 1. Calcium score = 10 2. Mild non-calcified plaque in LAD (25-49%) 3. Mild non-calcified plaque in the first diagonal (25-49%) 4. Moderate mixed plaque in the RCA (50-69%) 5. Left dominant system   EXERCISE NUCLEAR STRESS TEST 4/2022: 1. Negative for ischemia, EF 61% 2. Normal BP response 3. DTS = 10, achieved 11 METS   ECHO 4/2022: 1. TDS 2. Contrast used 3. Normal LV size, systolic and diastolic function. EF 60-65% 4. Mild LAE 5. Normal RV/RA 5. Mild PI, trivial MR

## 2023-08-04 NOTE — PHYSICAL EXAM
[Normal] : clear lung fields, good air entry, no respiratory distress [Soft] : abdomen soft [Normal Gait] : normal gait [Edema ___] : edema [unfilled] [Moves all extremities] : moves all extremities [Alert and Oriented] : alert and oriented [de-identified] : obese

## 2023-08-25 ENCOUNTER — APPOINTMENT (OUTPATIENT)
Dept: CARDIOLOGY | Facility: CLINIC | Age: 43
End: 2023-08-25
Payer: COMMERCIAL

## 2023-08-25 PROCEDURE — 93015 CV STRESS TEST SUPVJ I&R: CPT

## 2023-08-30 RX ORDER — ASPIRIN ENTERIC COATED TABLETS 81 MG 81 MG/1
81 TABLET, DELAYED RELEASE ORAL DAILY
Qty: 90 | Refills: 1 | Status: ACTIVE | COMMUNITY
Start: 1900-01-01 | End: 1900-01-01

## 2023-09-15 ENCOUNTER — APPOINTMENT (OUTPATIENT)
Dept: CARDIOLOGY | Facility: CLINIC | Age: 43
End: 2023-09-15

## 2023-10-10 ENCOUNTER — NON-APPOINTMENT (OUTPATIENT)
Age: 43
End: 2023-10-10

## 2023-10-10 ENCOUNTER — APPOINTMENT (OUTPATIENT)
Dept: CARDIOLOGY | Facility: CLINIC | Age: 43
End: 2023-10-10
Payer: COMMERCIAL

## 2023-10-10 VITALS
SYSTOLIC BLOOD PRESSURE: 120 MMHG | DIASTOLIC BLOOD PRESSURE: 84 MMHG | WEIGHT: 315 LBS | BODY MASS INDEX: 40.43 KG/M2 | RESPIRATION RATE: 15 BRPM | OXYGEN SATURATION: 96 % | HEIGHT: 74 IN | HEART RATE: 66 BPM

## 2023-10-10 DIAGNOSIS — Z87.891 PERSONAL HISTORY OF NICOTINE DEPENDENCE: ICD-10-CM

## 2023-10-10 DIAGNOSIS — R94.31 ABNORMAL ELECTROCARDIOGRAM [ECG] [EKG]: ICD-10-CM

## 2023-10-10 PROCEDURE — 99214 OFFICE O/P EST MOD 30 MIN: CPT | Mod: 25

## 2023-10-10 PROCEDURE — 93000 ELECTROCARDIOGRAM COMPLETE: CPT

## 2023-10-23 ENCOUNTER — APPOINTMENT (OUTPATIENT)
Dept: CARDIOLOGY | Facility: CLINIC | Age: 43
End: 2023-10-23
Payer: COMMERCIAL

## 2023-10-23 PROCEDURE — 93798 PHYS/QHP OP CAR RHAB W/ECG: CPT

## 2023-10-25 ENCOUNTER — APPOINTMENT (OUTPATIENT)
Dept: CARDIOLOGY | Facility: CLINIC | Age: 43
End: 2023-10-25
Payer: COMMERCIAL

## 2023-10-25 PROCEDURE — 93798 PHYS/QHP OP CAR RHAB W/ECG: CPT

## 2023-10-30 ENCOUNTER — APPOINTMENT (OUTPATIENT)
Dept: CARDIOLOGY | Facility: CLINIC | Age: 43
End: 2023-10-30

## 2023-11-01 ENCOUNTER — APPOINTMENT (OUTPATIENT)
Dept: CARDIOLOGY | Facility: CLINIC | Age: 43
End: 2023-11-01

## 2023-11-06 ENCOUNTER — APPOINTMENT (OUTPATIENT)
Dept: CARDIOLOGY | Facility: CLINIC | Age: 43
End: 2023-11-06

## 2023-11-08 ENCOUNTER — APPOINTMENT (OUTPATIENT)
Dept: CARDIOLOGY | Facility: CLINIC | Age: 43
End: 2023-11-08

## 2023-11-13 ENCOUNTER — APPOINTMENT (OUTPATIENT)
Dept: CARDIOLOGY | Facility: CLINIC | Age: 43
End: 2023-11-13

## 2023-11-15 ENCOUNTER — APPOINTMENT (OUTPATIENT)
Dept: CARDIOLOGY | Facility: CLINIC | Age: 43
End: 2023-11-15

## 2023-11-20 ENCOUNTER — APPOINTMENT (OUTPATIENT)
Dept: CARDIOLOGY | Facility: CLINIC | Age: 43
End: 2023-11-20
Payer: COMMERCIAL

## 2023-11-20 ENCOUNTER — NON-APPOINTMENT (OUTPATIENT)
Age: 43
End: 2023-11-20

## 2023-11-20 LAB
CHOLEST SERPL-MCNC: 138 MG/DL
HDLC SERPL-MCNC: 30 MG/DL
LDLC SERPL CALC-MCNC: 74 MG/DL
NONHDLC SERPL-MCNC: 108 MG/DL
TRIGL SERPL-MCNC: 199 MG/DL

## 2023-11-20 PROCEDURE — 93798 PHYS/QHP OP CAR RHAB W/ECG: CPT

## 2023-11-20 RX ORDER — METOPROLOL SUCCINATE 25 MG/1
25 TABLET, EXTENDED RELEASE ORAL DAILY
Qty: 45 | Refills: 1 | Status: ACTIVE | COMMUNITY
Start: 1900-01-01 | End: 1900-01-01

## 2023-11-20 RX ORDER — CLOPIDOGREL BISULFATE 75 MG/1
75 TABLET, FILM COATED ORAL DAILY
Qty: 90 | Refills: 1 | Status: ACTIVE | COMMUNITY
Start: 1900-01-01 | End: 1900-01-01

## 2023-11-22 ENCOUNTER — APPOINTMENT (OUTPATIENT)
Dept: CARDIOLOGY | Facility: CLINIC | Age: 43
End: 2023-11-22
Payer: COMMERCIAL

## 2023-11-22 PROCEDURE — 93798 PHYS/QHP OP CAR RHAB W/ECG: CPT

## 2023-11-27 ENCOUNTER — APPOINTMENT (OUTPATIENT)
Dept: CARDIOLOGY | Facility: CLINIC | Age: 43
End: 2023-11-27
Payer: COMMERCIAL

## 2023-11-27 PROCEDURE — 93798 PHYS/QHP OP CAR RHAB W/ECG: CPT

## 2023-11-29 ENCOUNTER — APPOINTMENT (OUTPATIENT)
Dept: CARDIOLOGY | Facility: CLINIC | Age: 43
End: 2023-11-29

## 2023-12-04 ENCOUNTER — APPOINTMENT (OUTPATIENT)
Dept: CARDIOLOGY | Facility: CLINIC | Age: 43
End: 2023-12-04

## 2023-12-06 ENCOUNTER — APPOINTMENT (OUTPATIENT)
Dept: CARDIOLOGY | Facility: CLINIC | Age: 43
End: 2023-12-06

## 2023-12-11 ENCOUNTER — APPOINTMENT (OUTPATIENT)
Dept: CARDIOLOGY | Facility: CLINIC | Age: 43
End: 2023-12-11

## 2023-12-13 ENCOUNTER — APPOINTMENT (OUTPATIENT)
Dept: CARDIOLOGY | Facility: CLINIC | Age: 43
End: 2023-12-13

## 2023-12-18 ENCOUNTER — APPOINTMENT (OUTPATIENT)
Dept: CARDIOLOGY | Facility: CLINIC | Age: 43
End: 2023-12-18

## 2023-12-20 ENCOUNTER — APPOINTMENT (OUTPATIENT)
Dept: CARDIOLOGY | Facility: CLINIC | Age: 43
End: 2023-12-20

## 2023-12-27 ENCOUNTER — APPOINTMENT (OUTPATIENT)
Dept: CARDIOLOGY | Facility: CLINIC | Age: 43
End: 2023-12-27

## 2024-01-03 ENCOUNTER — APPOINTMENT (OUTPATIENT)
Dept: CARDIOLOGY | Facility: CLINIC | Age: 44
End: 2024-01-03

## 2024-01-08 ENCOUNTER — APPOINTMENT (OUTPATIENT)
Dept: CARDIOLOGY | Facility: CLINIC | Age: 44
End: 2024-01-08

## 2024-01-09 ENCOUNTER — NON-APPOINTMENT (OUTPATIENT)
Age: 44
End: 2024-01-09

## 2024-01-09 ENCOUNTER — APPOINTMENT (OUTPATIENT)
Dept: CARDIOLOGY | Facility: CLINIC | Age: 44
End: 2024-01-09
Payer: COMMERCIAL

## 2024-01-09 VITALS
WEIGHT: 315 LBS | DIASTOLIC BLOOD PRESSURE: 82 MMHG | RESPIRATION RATE: 15 BRPM | HEART RATE: 64 BPM | SYSTOLIC BLOOD PRESSURE: 110 MMHG | BODY MASS INDEX: 40.43 KG/M2 | OXYGEN SATURATION: 96 % | HEIGHT: 74 IN

## 2024-01-09 DIAGNOSIS — I25.2 OLD MYOCARDIAL INFARCTION: ICD-10-CM

## 2024-01-09 PROCEDURE — 99214 OFFICE O/P EST MOD 30 MIN: CPT | Mod: 25

## 2024-01-09 PROCEDURE — 93000 ELECTROCARDIOGRAM COMPLETE: CPT

## 2024-01-10 ENCOUNTER — APPOINTMENT (OUTPATIENT)
Dept: CARDIOLOGY | Facility: CLINIC | Age: 44
End: 2024-01-10

## 2024-01-17 ENCOUNTER — APPOINTMENT (OUTPATIENT)
Dept: CARDIOLOGY | Facility: CLINIC | Age: 44
End: 2024-01-17

## 2024-01-22 ENCOUNTER — APPOINTMENT (OUTPATIENT)
Dept: CARDIOLOGY | Facility: CLINIC | Age: 44
End: 2024-01-22

## 2024-01-24 ENCOUNTER — NON-APPOINTMENT (OUTPATIENT)
Age: 44
End: 2024-01-24

## 2024-01-31 ENCOUNTER — RX RENEWAL (OUTPATIENT)
Age: 44
End: 2024-01-31

## 2024-01-31 RX ORDER — SEMAGLUTIDE 1.34 MG/ML
4 INJECTION, SOLUTION SUBCUTANEOUS
Qty: 3 | Refills: 5 | Status: ACTIVE | COMMUNITY
Start: 2023-10-12 | End: 1900-01-01

## 2024-02-12 LAB
ANION GAP SERPL CALC-SCNC: 9 MMOL/L
BUN SERPL-MCNC: 14 MG/DL
CALCIUM SERPL-MCNC: 9.2 MG/DL
CHLORIDE SERPL-SCNC: 106 MMOL/L
CHOLEST SERPL-MCNC: 134 MG/DL
CO2 SERPL-SCNC: 26 MMOL/L
CREAT SERPL-MCNC: 0.96 MG/DL
EGFR: 101 ML/MIN/1.73M2
GLUCOSE SERPL-MCNC: 112 MG/DL
HDLC SERPL-MCNC: 36 MG/DL
LDLC SERPL CALC-MCNC: 79 MG/DL
MAGNESIUM SERPL-MCNC: 2.1 MG/DL
NONHDLC SERPL-MCNC: 99 MG/DL
POTASSIUM SERPL-SCNC: 4.6 MMOL/L
SODIUM SERPL-SCNC: 141 MMOL/L
TRIGL SERPL-MCNC: 104 MG/DL

## 2024-02-12 NOTE — PHYSICAL EXAM
[Normal] : clear lung fields, good air entry, no respiratory distress [Soft] : abdomen soft [Normal Gait] : normal gait [Edema ___] : edema [unfilled] [Moves all extremities] : moves all extremities [Alert and Oriented] : alert and oriented [de-identified] : obese

## 2024-02-12 NOTE — ADDENDUM
[FreeTextEntry1] : 2/12/2024: BW reviewed and mostly unremarkable. LDL mildly above goal at 79 and low HDL. Continue with diet/exercise/weight loss and current statin dose. Repeat at follow up and adjust statin then if needed

## 2024-02-12 NOTE — HISTORY OF PRESENT ILLNESS
[FreeTextEntry1] : Patient is a 43yo M with obesity, former smoker, family history of CAD here for  follow up. Was in hospital 2022 year, had negative trops and CTA showing mild CAD with CAC of 10, mild disease in LAD/Diag and 50-69% RCA disease.  Had  undergone echo and nuclear stress testing that were negative then.    Had stopped his meds several months back. Then developed exertional CP that resolved with rest. Advised to go to ED and suffered small NSTEMI with trops to 308 as peak. Cath with 95% mRCA stenosis and underwent PCI with CHANDU 8/2023.    Feeling better now No dyspnea or chest pain. Patient denies PND/orthopnea/edema/palpitations/syncope/claudication. Was unable to complete cardiac rehab due to work conflicts. Exercising nightly however with cardio and weights. Feels well, no change in weight. [employer] in [employer]   Engaged, has young son. Moving out of inlaws now. Works as  for nursing home.   ROS: GI and  negative

## 2024-02-12 NOTE — ASSESSMENT
[FreeTextEntry1] :   ECG: SR, no significant ST-T abnormalities and normal intervals , low voltage  HDL 30 LDL 74  (11/2023)   EST 8/2023 (post cath) 1. Negative for ischemia 2. Achieved 7min, 8 METS 3. Normal HR/BP response, resting /90  LHC 8/2023: 1. LM normal 2. pLAD 20%, mLAD 20% 3. LCx normal 4. mRCA 95% s/p CHANDU with one Reolute [id number] 5. Left dominant circulation  ECHO 8/2023 GSH: 1. Normal LV size, systolic and diastolic function. EF 55-60%, no RWMA 2. Mild BRAVO 3. No clinically significant valvular disease  CARDIAC CTA 3/2022: 1. Calcium score = 10 2. Mild non-calcified plaque in LAD (25-49%) 3. Mild non-calcified plaque in the first diagonal (25-49%) 4. Moderate mixed plaque in the RCA (50-69%) 5. Left dominant system  EXERCISE NUCLEAR STRESS TEST 4/2022: 1. Negative for ischemia, EF 61% 2. Normal BP response 3. DTS = 10, achieved 11 METS  ECHO 4/2022: 1. TDS 2. Contrast used 3. Normal LV size, systolic and diastolic function. EF 60-65% 4. Mild LAE 5. Normal RV/RA 5. Mild PI, trivial MR.

## 2024-02-12 NOTE — DISCUSSION/SUMMARY
[EKG obtained to assist in diagnosis and management of assessed problem(s)] : EKG obtained to assist in diagnosis and management of assessed problem(s) [FreeTextEntry1] : Patient is a 42yo M with obseity, former smoker, family history of CAD, CAD here for  follow up.  -Recently with NSTEMI and s/p PCI to RCA with CHANDU 8/2023, Resolute [id number]. Trop I  peaked at 303 -Left dominant system with mild nonobstructive disease in LAD (20%) -Echo 8/2023 with normal BiV function and no RWMA, no valve disease -Recovering well, BP controlled. -ECG 8/2023 negative and achieved 8 METS as part of prer ehab eval -ECG today unremarkable, exercising regularly and feeling well despite no improvement in weight. Will increase  dose of ozempic.  -On low dose beta blocker for NSTEMI, BP running low and hold doff adding ACEI/ARB.     1. Continue medical management of CAD , DAPT and high dose statin. On beta blocker due to NSTEMI, no ACEI due to lower BP. Continue metoprolol up to 3 years post infarct  2. Recommend aggressive diet and lifestyle modifications  3. Check BW and lipids in coming weeks 4. Increase Ozempic to 1mg weekly 5. Follow up 3 months

## 2024-02-14 ENCOUNTER — NON-APPOINTMENT (OUTPATIENT)
Age: 44
End: 2024-02-14

## 2024-02-16 ENCOUNTER — RX RENEWAL (OUTPATIENT)
Age: 44
End: 2024-02-16

## 2024-02-16 RX ORDER — ASPIRIN 81 MG/1
81 TABLET, COATED ORAL
Qty: 90 | Refills: 1 | Status: ACTIVE | COMMUNITY
Start: 2024-02-16 | End: 1900-01-01

## 2024-04-12 LAB
CHOLEST SERPL-MCNC: 149 MG/DL
HDLC SERPL-MCNC: 34 MG/DL
LDLC SERPL CALC-MCNC: 85 MG/DL
NONHDLC SERPL-MCNC: 115 MG/DL
TRIGL SERPL-MCNC: 175 MG/DL

## 2024-04-16 ENCOUNTER — NON-APPOINTMENT (OUTPATIENT)
Age: 44
End: 2024-04-16

## 2024-04-16 ENCOUNTER — APPOINTMENT (OUTPATIENT)
Dept: CARDIOLOGY | Facility: CLINIC | Age: 44
End: 2024-04-16
Payer: COMMERCIAL

## 2024-04-16 VITALS
SYSTOLIC BLOOD PRESSURE: 124 MMHG | BODY MASS INDEX: 40.43 KG/M2 | RESPIRATION RATE: 16 BRPM | HEART RATE: 60 BPM | WEIGHT: 315 LBS | HEIGHT: 74 IN | DIASTOLIC BLOOD PRESSURE: 82 MMHG | OXYGEN SATURATION: 98 %

## 2024-04-16 DIAGNOSIS — E66.9 OBESITY, UNSPECIFIED: ICD-10-CM

## 2024-04-16 DIAGNOSIS — Z95.5 PRESENCE OF CORONARY ANGIOPLASTY IMPLANT AND GRAFT: ICD-10-CM

## 2024-04-16 DIAGNOSIS — I25.10 ATHEROSCLEROTIC HEART DISEASE OF NATIVE CORONARY ARTERY W/OUT ANGINA PECTORIS: ICD-10-CM

## 2024-04-16 PROCEDURE — 93000 ELECTROCARDIOGRAM COMPLETE: CPT

## 2024-04-16 PROCEDURE — G2211 COMPLEX E/M VISIT ADD ON: CPT | Mod: NC,1L

## 2024-04-16 PROCEDURE — 99214 OFFICE O/P EST MOD 30 MIN: CPT

## 2024-04-16 RX ORDER — ATORVASTATIN CALCIUM 80 MG/1
80 TABLET, FILM COATED ORAL
Qty: 90 | Refills: 3 | Status: ACTIVE | COMMUNITY
Start: 1900-01-01 | End: 1900-01-01

## 2024-04-16 NOTE — DISCUSSION/SUMMARY
[FreeTextEntry1] : Patient is a 44yo M with obseity, former smoker, family history of CAD, CAD here for  follow up.  -NSTEMI summer 2023 and s/p PCI to RCA with CHANDU 8/2023, Resolute [id number]. Trop I  peaked at 303 -Left dominant system with mild nonobstructive disease in LAD (20%) -Echo 8/2023 with normal BiV function and no RWMA, no valve disease -ECG 8/2023 negative and achieved 8 METS as part of prer ehab eval  -On low dose beta blocker for NSTEMI, BP running low and hold doff adding ACEI/ARB.   -Lipids 4/2024 remain with LDL above goal -Exercising regularly without symptoms at this time    1. Continue medical management of CAD , DAPT and high dose statin. On beta blocker due to NSTEMI. Continue metoprolol up to 3 years post infarct  2. Recommend aggressive diet and lifestyle modifications  3. Increase Atorvastatin to 80mg qhs 4. Recommend 30 minutes moderate intensity aerobic activity 5 days per week , add in cople days per week of light  resistance training to help lose weight 5. Follow up 6 months, repeat lipids then 6. Regular PMD follow up  [EKG obtained to assist in diagnosis and management of assessed problem(s)] : EKG obtained to assist in diagnosis and management of assessed problem(s)

## 2024-04-16 NOTE — ASSESSMENT
[FreeTextEntry1] : ECG: SR, no significant ST-T abnormalities and normal intervals    HDL 34 LDL 85  (4/2024)   HDL 30 LDL 74  (11/2023)   EST 8/2023 (post cath) 1. Negative for ischemia 2. Achieved 7min, 8 METS 3. Normal HR/BP response, resting /90  LHC 8/2023: 1. LM normal 2. pLAD 20%, mLAD 20% 3. LCx normal 4. mRCA 95% s/p CHANDU with one Reolute [id number] 5. Left dominant circulation  ECHO 8/2023 GSH: 1. Normal LV size, systolic and diastolic function. EF 55-60%, no RWMA 2. Mild BRAVO 3. No clinically significant valvular disease  CARDIAC CTA 3/2022: 1. Calcium score = 10 2. Mild non-calcified plaque in LAD (25-49%) 3. Mild non-calcified plaque in the first diagonal (25-49%) 4. Moderate mixed plaque in the RCA (50-69%) 5. Left dominant system  EXERCISE NUCLEAR STRESS TEST 4/2022: 1. Negative for ischemia, EF 61% 2. Normal BP response 3. DTS = 10, achieved 11 METS  ECHO 4/2022: 1. TDS 2. Contrast used 3. Normal LV size, systolic and diastolic function. EF 60-65% 4. Mild LAE 5. Normal RV/RA 5. Mild PI, trivial MR.

## 2024-04-16 NOTE — PHYSICAL EXAM
[Normal] : clear lung fields, good air entry, no respiratory distress [Soft] : abdomen soft [Normal Gait] : normal gait [Edema ___] : edema [unfilled] [Moves all extremities] : moves all extremities [Alert and Oriented] : alert and oriented [de-identified] : obese

## 2024-04-16 NOTE — HISTORY OF PRESENT ILLNESS
[FreeTextEntry1] : Patient is a 44yo M with obesity, former smoker, family history of CAD here for  follow up. Was in hospital 2022 year, had negative trops and CTA showing mild CAD with CAC of 10, mild disease in LAD/Diag and 50-69% RCA disease.  Had  undergone echo and nuclear stress testing that were negative then.   Then stopped his meds earlier in 2023. Developed exertional CP that resolved with rest. Advised to go to ED and suffered small NSTEMI with trops to 308 as peak. Cath with 95% mRCA stenosis and underwent PCI with CHANDU 8/2023.   Mild chest discomfort with stress at work, nothing like what he had over last summer 2023. No exertional CP/SOB. Patient denies PND/orthopnea/edema/palpitations/syncope/claudication    Engaged, has young son. Moving out of inlaws now. Works as  for nursing home.   ROS: GI and  negative

## 2024-08-12 ENCOUNTER — EMERGENCY (EMERGENCY)
Facility: HOSPITAL | Age: 44
LOS: 1 days | Discharge: HOME | End: 2024-08-12
Attending: EMERGENCY MEDICINE
Payer: COMMERCIAL

## 2024-08-12 VITALS
OXYGEN SATURATION: 98 % | TEMPERATURE: 98 F | WEIGHT: 315 LBS | RESPIRATION RATE: 20 BRPM | SYSTOLIC BLOOD PRESSURE: 134 MMHG | HEART RATE: 70 BPM | HEIGHT: 74 IN | DIASTOLIC BLOOD PRESSURE: 86 MMHG

## 2024-08-12 LAB
ALBUMIN SERPL ELPH-MCNC: 4.1 G/DL — SIGNIFICANT CHANGE UP (ref 3.3–5.2)
ALP SERPL-CCNC: 103 U/L — SIGNIFICANT CHANGE UP (ref 40–120)
ALT FLD-CCNC: 36 U/L — SIGNIFICANT CHANGE UP
ANION GAP SERPL CALC-SCNC: 13 MMOL/L — SIGNIFICANT CHANGE UP (ref 5–17)
APTT BLD: 32.1 SEC — SIGNIFICANT CHANGE UP (ref 24.5–35.6)
AST SERPL-CCNC: 30 U/L — SIGNIFICANT CHANGE UP
BASOPHILS # BLD AUTO: 0.05 K/UL — SIGNIFICANT CHANGE UP (ref 0–0.2)
BASOPHILS NFR BLD AUTO: 0.4 % — SIGNIFICANT CHANGE UP (ref 0–2)
BILIRUB SERPL-MCNC: 0.3 MG/DL — LOW (ref 0.4–2)
BLD GP AB SCN SERPL QL: SIGNIFICANT CHANGE UP
BUN SERPL-MCNC: 16.4 MG/DL — SIGNIFICANT CHANGE UP (ref 8–20)
CALCIUM SERPL-MCNC: 8.7 MG/DL — SIGNIFICANT CHANGE UP (ref 8.4–10.5)
CHLORIDE SERPL-SCNC: 102 MMOL/L — SIGNIFICANT CHANGE UP (ref 96–108)
CO2 SERPL-SCNC: 24 MMOL/L — SIGNIFICANT CHANGE UP (ref 22–29)
CREAT SERPL-MCNC: 0.89 MG/DL — SIGNIFICANT CHANGE UP (ref 0.5–1.3)
EGFR: 110 ML/MIN/1.73M2 — SIGNIFICANT CHANGE UP
EOSINOPHIL # BLD AUTO: 0.21 K/UL — SIGNIFICANT CHANGE UP (ref 0–0.5)
EOSINOPHIL NFR BLD AUTO: 1.9 % — SIGNIFICANT CHANGE UP (ref 0–6)
GLUCOSE SERPL-MCNC: 162 MG/DL — HIGH (ref 70–99)
HCT VFR BLD CALC: 37.5 % — LOW (ref 39–50)
HGB BLD-MCNC: 12.6 G/DL — LOW (ref 13–17)
IMM GRANULOCYTES NFR BLD AUTO: 0.5 % — SIGNIFICANT CHANGE UP (ref 0–0.9)
INR BLD: 1 RATIO — SIGNIFICANT CHANGE UP (ref 0.85–1.18)
LYMPHOCYTES # BLD AUTO: 2.89 K/UL — SIGNIFICANT CHANGE UP (ref 1–3.3)
LYMPHOCYTES # BLD AUTO: 25.8 % — SIGNIFICANT CHANGE UP (ref 13–44)
MCHC RBC-ENTMCNC: 29.3 PG — SIGNIFICANT CHANGE UP (ref 27–34)
MCHC RBC-ENTMCNC: 33.6 GM/DL — SIGNIFICANT CHANGE UP (ref 32–36)
MCV RBC AUTO: 87.2 FL — SIGNIFICANT CHANGE UP (ref 80–100)
MONOCYTES # BLD AUTO: 0.67 K/UL — SIGNIFICANT CHANGE UP (ref 0–0.9)
MONOCYTES NFR BLD AUTO: 6 % — SIGNIFICANT CHANGE UP (ref 2–14)
NEUTROPHILS # BLD AUTO: 7.32 K/UL — SIGNIFICANT CHANGE UP (ref 1.8–7.4)
NEUTROPHILS NFR BLD AUTO: 65.4 % — SIGNIFICANT CHANGE UP (ref 43–77)
PLATELET # BLD AUTO: 255 K/UL — SIGNIFICANT CHANGE UP (ref 150–400)
POTASSIUM SERPL-MCNC: 4 MMOL/L — SIGNIFICANT CHANGE UP (ref 3.5–5.3)
POTASSIUM SERPL-SCNC: 4 MMOL/L — SIGNIFICANT CHANGE UP (ref 3.5–5.3)
PROT SERPL-MCNC: 6.9 G/DL — SIGNIFICANT CHANGE UP (ref 6.6–8.7)
PROTHROM AB SERPL-ACNC: 11.1 SEC — SIGNIFICANT CHANGE UP (ref 9.5–13)
RBC # BLD: 4.3 M/UL — SIGNIFICANT CHANGE UP (ref 4.2–5.8)
RBC # FLD: 13.4 % — SIGNIFICANT CHANGE UP (ref 10.3–14.5)
SODIUM SERPL-SCNC: 139 MMOL/L — SIGNIFICANT CHANGE UP (ref 135–145)
TROPONIN T, HIGH SENSITIVITY RESULT: <6 NG/L — SIGNIFICANT CHANGE UP (ref 0–51)
WBC # BLD: 11.2 K/UL — HIGH (ref 3.8–10.5)
WBC # FLD AUTO: 11.2 K/UL — HIGH (ref 3.8–10.5)

## 2024-08-12 PROCEDURE — 93010 ELECTROCARDIOGRAM REPORT: CPT

## 2024-08-12 PROCEDURE — 71045 X-RAY EXAM CHEST 1 VIEW: CPT | Mod: 26

## 2024-08-12 PROCEDURE — 99285 EMERGENCY DEPT VISIT HI MDM: CPT

## 2024-08-12 RX ORDER — MAGNESIUM, ALUMINUM HYDROXIDE 200-225/5
30 SUSPENSION, ORAL (FINAL DOSE FORM) ORAL ONCE
Refills: 0 | Status: COMPLETED | OUTPATIENT
Start: 2024-08-12 | End: 2024-08-12

## 2024-08-12 RX ORDER — FAMOTIDINE 40 MG/1
20 TABLET, FILM COATED ORAL ONCE
Refills: 0 | Status: COMPLETED | OUTPATIENT
Start: 2024-08-12 | End: 2024-08-12

## 2024-08-12 RX ADMIN — FAMOTIDINE 100 MILLIGRAM(S): 40 TABLET, FILM COATED ORAL at 22:57

## 2024-08-12 RX ADMIN — Medication 30 MILLILITER(S): at 22:57

## 2024-08-12 NOTE — ED PROVIDER NOTE - CARE PROVIDER_API CALL
Stella Luque  Gastroenterology  [address], [address]  [place] [address]  Phone: [phone]  Fax: [phone]  Follow Up Time: 7-10 Days

## 2024-08-12 NOTE — ED ADULT NURSE NOTE - MODE OF DISCHARGE
Patient states she has a low grade fever and pain in stomach, she would olny like to see Dr Hunter Ellis.   I have her on the wait list but we also set up an appt for Monday 10/28/19 at 1:45, pateint is wondering if there is anything that she is able to do in the Ambulatory

## 2024-08-12 NOTE — ED ADULT NURSE NOTE - OBJECTIVE STATEMENT
Patient presents with c/o mid sternal chest and epigastric pain.  Pt A&Ox4, denies sob, states chest pain feels different from prior MI.  Pt is resting on stretcher in no acute distress at this time with even, unlabored respirations. Pt appears NSR on cardiac monitor, negative JVD/diaphoresis.  Cap refill <2 sec. No other complaints voiced at this time.

## 2024-08-12 NOTE — ED PROVIDER NOTE - OBJECTIVE STATEMENT
43 year old male with pmhx of MI s/p 1 stent (Aug 2023) presenting with chest pain. Patient reports constant, midsternal chest pain since 7am today. Also reports  Pain does not radiate and he rates it as 8/10. He last saw his cardiologist (Dr. Ace Osborn) 3 months ago and had a normal checkup at that time. He has been taking his medications as prescribed. He denies shortness of breath, 43 year old male with pmhx of MI s/p 1 stent (Aug 2023) presenting with chest pain. Patient reports constant, midsternal chest pain since 7am today. Also reports feeling lightheaded. Pain does not radiate and he rates it as 8/10. Says the pain feels different than the pain he had when he had his MI. He last saw his cardiologist (Dr. Ace Osborn) 3 months ago and had a normal checkup at that time. He has been taking his medications as prescribed. He denies shortness of breath, abdominal pain, nausea, diaphoresis

## 2024-08-12 NOTE — ED ADULT TRIAGE NOTE - NSSEPSISSUSPECTED_ED_A_ED
"Chief Complaint   Patient presents with    Other     Needlestick exposure from unknown source.     Pt BIB EMS for above complaint. Pt was cleaning ambulance when she felt a needlestick from an unknown source in her right index finger. Pt A&O x4, GCS 15, VSS.  /75   Pulse 91   Temp 36.2 °C (97.2 °F) (Temporal)   Resp 15   Ht 1.753 m (5' 9\")   Wt 61.2 kg (135 lb)   SpO2 97%   BMI 19.94 kg/m²     "
No

## 2024-08-12 NOTE — ED PROVIDER NOTE - ATTENDING CONTRIBUTION TO CARE
Seen with resident.  Consuelo adult male with a history of CAD and 1 prior stent, presenting with relatively acute onset of epigastric abdominal pain radiating up into his chest that began earlier today.  Patient denies any shortness of breath or diaphoresis, and states that it feels different than his prior anginal pain.  Patient has been compliant with all of his medications and follows with cardiology regularly.  On exam, well-appearing, no acute distress.  Normal heart and lung sounds.  Positive reproducible epigastric abdominal tenderness.  No other focal abdominal tenderness, no rebound or guarding.  Labs reviewed, with no acute actionable findings found, including negative troponin and negative D-dimer.  Chest x-ray is also negative.  Ultrasound of the right upper quadrant was negative, lipase negative.  Patient with some improvement with symptoms for GI symptomatology.  Will treat with 2-week course of PPI.  And refer to GI.  Understands precautions for return.

## 2024-08-13 ENCOUNTER — APPOINTMENT (OUTPATIENT)
Dept: CARDIOLOGY | Facility: CLINIC | Age: 44
End: 2024-08-13
Payer: COMMERCIAL

## 2024-08-13 ENCOUNTER — NON-APPOINTMENT (OUTPATIENT)
Age: 44
End: 2024-08-13

## 2024-08-13 VITALS
RESPIRATION RATE: 16 BRPM | OXYGEN SATURATION: 96 % | SYSTOLIC BLOOD PRESSURE: 118 MMHG | HEART RATE: 67 BPM | DIASTOLIC BLOOD PRESSURE: 86 MMHG | HEIGHT: 74 IN

## 2024-08-13 VITALS
SYSTOLIC BLOOD PRESSURE: 110 MMHG | OXYGEN SATURATION: 97 % | RESPIRATION RATE: 16 BRPM | HEART RATE: 65 BPM | DIASTOLIC BLOOD PRESSURE: 72 MMHG | TEMPERATURE: 98 F

## 2024-08-13 DIAGNOSIS — I25.10 ATHEROSCLEROTIC HEART DISEASE OF NATIVE CORONARY ARTERY W/OUT ANGINA PECTORIS: ICD-10-CM

## 2024-08-13 DIAGNOSIS — Z82.49 FAMILY HISTORY OF ISCHEMIC HEART DISEASE AND OTHER DISEASES OF THE CIRCULATORY SYSTEM: ICD-10-CM

## 2024-08-13 DIAGNOSIS — E66.9 OBESITY, UNSPECIFIED: ICD-10-CM

## 2024-08-13 DIAGNOSIS — R07.9 CHEST PAIN, UNSPECIFIED: ICD-10-CM

## 2024-08-13 DIAGNOSIS — Z87.891 PERSONAL HISTORY OF NICOTINE DEPENDENCE: ICD-10-CM

## 2024-08-13 PROCEDURE — G2211 COMPLEX E/M VISIT ADD ON: CPT | Mod: NC

## 2024-08-13 PROCEDURE — 93000 ELECTROCARDIOGRAM COMPLETE: CPT

## 2024-08-13 PROCEDURE — 99214 OFFICE O/P EST MOD 30 MIN: CPT

## 2024-08-13 PROCEDURE — 86901 BLOOD TYPING SEROLOGIC RH(D): CPT

## 2024-08-13 PROCEDURE — 85610 PROTHROMBIN TIME: CPT

## 2024-08-13 PROCEDURE — 84484 ASSAY OF TROPONIN QUANT: CPT

## 2024-08-13 PROCEDURE — 71045 X-RAY EXAM CHEST 1 VIEW: CPT

## 2024-08-13 PROCEDURE — 86850 RBC ANTIBODY SCREEN: CPT

## 2024-08-13 PROCEDURE — 96374 THER/PROPH/DIAG INJ IV PUSH: CPT

## 2024-08-13 PROCEDURE — 80053 COMPREHEN METABOLIC PANEL: CPT

## 2024-08-13 PROCEDURE — 85730 THROMBOPLASTIN TIME PARTIAL: CPT

## 2024-08-13 PROCEDURE — 93005 ELECTROCARDIOGRAM TRACING: CPT

## 2024-08-13 PROCEDURE — 85025 COMPLETE CBC W/AUTO DIFF WBC: CPT

## 2024-08-13 PROCEDURE — 76705 ECHO EXAM OF ABDOMEN: CPT

## 2024-08-13 PROCEDURE — 83690 ASSAY OF LIPASE: CPT

## 2024-08-13 PROCEDURE — 76705 ECHO EXAM OF ABDOMEN: CPT | Mod: 26

## 2024-08-13 PROCEDURE — 85379 FIBRIN DEGRADATION QUANT: CPT

## 2024-08-13 PROCEDURE — 36415 COLL VENOUS BLD VENIPUNCTURE: CPT

## 2024-08-13 PROCEDURE — 86900 BLOOD TYPING SEROLOGIC ABO: CPT

## 2024-08-13 PROCEDURE — 99285 EMERGENCY DEPT VISIT HI MDM: CPT | Mod: 25

## 2024-08-13 RX ORDER — BISMUTH SUBSALICYLATE 262 MG
30 TABLET ORAL ONCE
Refills: 0 | Status: DISCONTINUED | OUTPATIENT
Start: 2024-08-13 | End: 2024-08-20

## 2024-08-13 RX ORDER — OMEPRAZOLE 100 %
1 POWDER (GRAM) MISCELLANEOUS
Qty: 14 | Refills: 0
Start: 2024-08-13 | End: 2024-08-26

## 2024-08-13 NOTE — ASSESSMENT
[FreeTextEntry1] : ECG: SR, no significant ST-T abnormalities and normal intervals    HDL 34 LDL 85  (4/2024)   HDL 30 LDL 74  (11/2023)   EST 8/2023 (post cath) 1. Negative for ischemia 2. Achieved 7min, 8 METS 3. Normal HR/BP response, resting /90  LHC 8/2023: 1. LM normal 2. pLAD 20%, mLAD 20% 3. LCx normal 4. mRCA 95% s/p CHANDU with one Reolute [id number] 5. Left dominant circulation  ECHO 8/2023 GSH: 1. Normal LV size, systolic and diastolic function. EF 55-60%, no RWMA 2. Mild BRAVO 3. No clinically significant valvular disease  CARDIAC CTA 3/2022: 1. Calcium score = 10 2. Mild non-calcified plaque in LAD (25-49%) 3. Mild non-calcified plaque in the first diagonal (25-49%) 4. Moderate mixed plaque in the RCA (50-69%) 5. Left dominant system  EXERCISE NUCLEAR STRESS TEST 4/2022: 1. Negative for ischemia, EF 61% 2. Normal BP response 3. DTS = 10, achieved 11 METS  ECHO 4/2022: 1. TDS 2. Contrast used 3. Normal LV size, systolic and diastolic function. EF 60-65% 4. Mild LAE 5. Normal RV/RA 5. Mild PI, trivial MR.

## 2024-08-13 NOTE — PHYSICAL EXAM
[Normal] : clear lung fields, good air entry, no respiratory distress [Soft] : abdomen soft [Normal Gait] : normal gait [Edema ___] : edema [unfilled] [Moves all extremities] : moves all extremities [Alert and Oriented] : alert and oriented [de-identified] : obese

## 2024-08-13 NOTE — HISTORY OF PRESENT ILLNESS
[FreeTextEntry1] : Patient is a 44yo M with CAD [facility], obesity, former smoker, family history of CAD here for  follow up. Was in [facility] ED last night with CP/epigastric pain. Trops were negative, discharged. CXR and ECG unremarkable as well. Noting increased stress both at work and home. Pain different from when had MI. Started after getting out of shower. Was in upper epigastric area then middle of sternum. Continued later in the day and took ASA at work. BP was 117/72 at work. Continued with discomfort in chest. Due to continued symptoms advised to go to ED at [facility]. Was given Mylanta and Pepcid without relief as of yet. States feels different from acid reflux. No dietary changes recently. Noting eye twitching he thinks is stress related, also struggling with sleep. NO exertional symptoms and no diaphoresis. NO change in pain with position or breathing    has young son. Moved out of in-laws. Works as  for nursing home.   ROS: GI and  negative

## 2024-08-13 NOTE — DISCUSSION/SUMMARY
[FreeTextEntry1] : Patient is a 42yo M with obesity, former smoker, family history of CAD, CAD here for  follow up.   CAD: -NSTEMI summer 2023 and s/p PCI to RCA with CHANDU 8/2023, Resolute [id number]. Trop I  peaked at 303 -Left dominant system with mild nonobstructive disease in LAD (20%) -Echo 8/2023 with normal BiV function and no RWMA, no valve disease -On low dose beta blocker for NSTEMI, BP running low and hold doff adding ACEI/ARB.    HLD: -Lipids 4/2024 remain with LDL above goal, atorvastatin increased to 80mg qhs then   CHEST PAIN -Recent symptoms due to costochondritis, significant TTP on exam.  Can use short course NSAIDS only -ECG unremarkable  OBESITY - Recommend aggressive diet and lifestyle modifications   1. Continue medical management of CAD , DAPT and high dose statin. On beta blocker due to NSTEMI. Continue metoprolol up to 3 years post infarct  2. Recommend aggressive diet and lifestyle modifications  3. Ibuprofen 600mg bid for 3-5 days only then can use tylenol as needed for pain 4. Recommend 30 minutes moderate intensity aerobic activity 5 days per week  5. Follow up 2months  6. Regular PMD follow up  [EKG obtained to assist in diagnosis and management of assessed problem(s)] : EKG obtained to assist in diagnosis and management of assessed problem(s)

## 2024-08-21 ENCOUNTER — RX RENEWAL (OUTPATIENT)
Age: 44
End: 2024-08-21

## 2024-10-15 ENCOUNTER — APPOINTMENT (OUTPATIENT)
Dept: CARDIOLOGY | Facility: CLINIC | Age: 44
End: 2024-10-15

## 2024-10-22 ENCOUNTER — APPOINTMENT (OUTPATIENT)
Dept: CARDIOLOGY | Facility: CLINIC | Age: 44
End: 2024-10-22
Payer: COMMERCIAL

## 2024-10-22 ENCOUNTER — NON-APPOINTMENT (OUTPATIENT)
Age: 44
End: 2024-10-22

## 2024-10-22 VITALS
HEIGHT: 74 IN | HEART RATE: 78 BPM | BODY MASS INDEX: 40.43 KG/M2 | DIASTOLIC BLOOD PRESSURE: 78 MMHG | SYSTOLIC BLOOD PRESSURE: 118 MMHG | WEIGHT: 315 LBS

## 2024-10-22 DIAGNOSIS — R07.9 CHEST PAIN, UNSPECIFIED: ICD-10-CM

## 2024-10-22 DIAGNOSIS — I25.10 ATHEROSCLEROTIC HEART DISEASE OF NATIVE CORONARY ARTERY W/OUT ANGINA PECTORIS: ICD-10-CM

## 2024-10-22 DIAGNOSIS — E66.9 OBESITY, UNSPECIFIED: ICD-10-CM

## 2024-10-22 PROCEDURE — 93000 ELECTROCARDIOGRAM COMPLETE: CPT

## 2024-10-22 PROCEDURE — 99214 OFFICE O/P EST MOD 30 MIN: CPT

## 2024-10-22 PROCEDURE — G2211 COMPLEX E/M VISIT ADD ON: CPT | Mod: NC

## 2025-01-21 ENCOUNTER — RX RENEWAL (OUTPATIENT)
Age: 45
End: 2025-01-21

## 2025-04-03 ENCOUNTER — TRANSCRIPTION ENCOUNTER (OUTPATIENT)
Age: 45
End: 2025-04-03

## 2025-04-03 ENCOUNTER — APPOINTMENT (OUTPATIENT)
Dept: CARDIOLOGY | Facility: CLINIC | Age: 45
End: 2025-04-03
Payer: COMMERCIAL

## 2025-04-03 PROCEDURE — 93000 ELECTROCARDIOGRAM COMPLETE: CPT

## 2025-04-08 LAB — TROPONIN I SERPL HS-MCNC: <3 NG/L

## 2025-04-09 ENCOUNTER — NON-APPOINTMENT (OUTPATIENT)
Age: 45
End: 2025-04-09

## 2025-04-10 ENCOUNTER — APPOINTMENT (OUTPATIENT)
Dept: CARDIOLOGY | Facility: CLINIC | Age: 45
End: 2025-04-10
Payer: COMMERCIAL

## 2025-04-10 VITALS
DIASTOLIC BLOOD PRESSURE: 81 MMHG | SYSTOLIC BLOOD PRESSURE: 117 MMHG | WEIGHT: 315 LBS | HEART RATE: 76 BPM | RESPIRATION RATE: 16 BRPM | BODY MASS INDEX: 40.43 KG/M2 | HEIGHT: 74 IN

## 2025-04-10 DIAGNOSIS — R07.9 CHEST PAIN, UNSPECIFIED: ICD-10-CM

## 2025-04-10 DIAGNOSIS — Z87.891 PERSONAL HISTORY OF NICOTINE DEPENDENCE: ICD-10-CM

## 2025-04-10 DIAGNOSIS — E78.5 HYPERLIPIDEMIA, UNSPECIFIED: ICD-10-CM

## 2025-04-10 DIAGNOSIS — I25.10 ATHEROSCLEROTIC HEART DISEASE OF NATIVE CORONARY ARTERY W/OUT ANGINA PECTORIS: ICD-10-CM

## 2025-04-10 PROCEDURE — G2211 COMPLEX E/M VISIT ADD ON: CPT

## 2025-04-10 PROCEDURE — 99214 OFFICE O/P EST MOD 30 MIN: CPT

## 2025-04-10 RX ORDER — EZETIMIBE 10 MG/1
10 TABLET ORAL
Qty: 90 | Refills: 2 | Status: ACTIVE | COMMUNITY
Start: 2025-04-10 | End: 1900-01-01

## 2025-04-19 ENCOUNTER — NON-APPOINTMENT (OUTPATIENT)
Age: 45
End: 2025-04-19

## 2025-04-22 ENCOUNTER — NON-APPOINTMENT (OUTPATIENT)
Age: 45
End: 2025-04-22

## 2025-04-24 ENCOUNTER — APPOINTMENT (OUTPATIENT)
Dept: SURGERY | Facility: CLINIC | Age: 45
End: 2025-04-24
Payer: COMMERCIAL

## 2025-04-24 PROCEDURE — 99203 OFFICE O/P NEW LOW 30 MIN: CPT | Mod: 95

## 2025-05-08 ENCOUNTER — APPOINTMENT (OUTPATIENT)
Dept: INTERNAL MEDICINE | Facility: CLINIC | Age: 45
End: 2025-05-08
Payer: COMMERCIAL

## 2025-05-08 DIAGNOSIS — E66.9 OBESITY, UNSPECIFIED: ICD-10-CM

## 2025-05-08 PROCEDURE — 99204 OFFICE O/P NEW MOD 45 MIN: CPT | Mod: 95

## 2025-05-15 NOTE — ED ADULT NURSE NOTE - CAS TRG GENERAL AIRWAY, MLM
Anticoagulation Progress Note    Indication: Atrial Fibrillation   Anemia multifactorial in the setting of acute blood loss, chronic kidney disease and mild iron deficiency  Goal INR: 2.0-3.0  Duration:long-term  Referring provider: To Edmond MD   Supervising provider: Humphrey Freitas DO  Order Expiration Date: 10/16/2025  Pertinent History: Hypertensive heart disease without heart failure Adverse reaction to ACE inhibitor drug/ Stage 3a chronic kidney disease (CMS/HCC)     Assessment-  telephonic-       Yes No    []  [x] Missed doses:       []  [x] Extra doses:      []  [x] Significant medication changes (RX, OTC, Herbal):    []  [x] High-risk maintenance medications:                 [x]  [] Vitamin K / dietary changes (Vitamin K goal: 2-3 times per week):    []  [x] Bleeding / bruising:      []  [x] Falls / injury:     []  [x] Acute illness:     []  [x] Alcohol intake: does not drink   []  [x] Procedures / hospitalization / ER visits:     [x]  [] Other: Patient has history of hematoma    Anticoagulation Summary  As of 5/15/2025      INR goal:  2.0-3.0   TTR:  79.3% (3.9 y)   INR used for dosin.3 (5/15/2025)   Full warfarin instructions:  7.5 mg every Simons; 5 mg all other days   Next INR check:  2025    Indications    Hypertensive heart disease without heart failure [I11.9]  Longstanding persistent atrial fibrillation  (CMD) [I48.11]  Acute blood loss anemia (Resolved) [D62]  Iron deficiency anemia due to chronic blood loss [D50.0]  Long term current use of anticoagulant [Z79.01]                 Anticoagulation Episode Summary       Send INR reminders to:  [facility] [address] [facility]          Anticoagulation Care Providers       Provider Role Specialty Phone number    To Edmond MD [facility] [phone]          Plan (5 mg peach colored tablets)    The INR result for today is therapeutic based on the INR goal 2.0-3.0.     Etiology:    Recommended Dose:   CPM: 7.5mg Sun  and 5mg ROW (37.5 mg/week).   Follow-Up: 1 week with home meter per DME protocol; 4 weeks per AAC protocol   Comments: n/a    Counseling  -    Stressed importance of compliance with consistent vitamin K intake  Stressed importance of adherence with recommended lab monitoring  Instructed to notify clinic about medication changes or health status changes  Instructed to notify clinic about scheduled procedures    Provided patient/caregiver with verbal dosing instructions, patient/caregiver verbalized understanding.                                                                                                                                                                                                                                                                                                                                                                                                                                Patent

## 2025-06-04 ENCOUNTER — RX RENEWAL (OUTPATIENT)
Age: 45
End: 2025-06-04

## 2025-07-08 ENCOUNTER — EMERGENCY (EMERGENCY)
Facility: HOSPITAL | Age: 45
LOS: 1 days | End: 2025-07-08
Attending: EMERGENCY MEDICINE
Payer: COMMERCIAL

## 2025-07-08 ENCOUNTER — NON-APPOINTMENT (OUTPATIENT)
Age: 45
End: 2025-07-08

## 2025-07-08 VITALS
OXYGEN SATURATION: 96 % | HEART RATE: 71 BPM | RESPIRATION RATE: 18 BRPM | SYSTOLIC BLOOD PRESSURE: 120 MMHG | DIASTOLIC BLOOD PRESSURE: 75 MMHG | TEMPERATURE: 97 F | WEIGHT: 315 LBS

## 2025-07-08 VITALS
HEART RATE: 63 BPM | TEMPERATURE: 98 F | SYSTOLIC BLOOD PRESSURE: 117 MMHG | DIASTOLIC BLOOD PRESSURE: 73 MMHG | RESPIRATION RATE: 18 BRPM | OXYGEN SATURATION: 97 %

## 2025-07-08 PROCEDURE — 73590 X-RAY EXAM OF LOWER LEG: CPT

## 2025-07-08 PROCEDURE — 99283 EMERGENCY DEPT VISIT LOW MDM: CPT

## 2025-07-08 PROCEDURE — 73590 X-RAY EXAM OF LOWER LEG: CPT | Mod: 26,RT

## 2025-07-08 PROCEDURE — 99284 EMERGENCY DEPT VISIT MOD MDM: CPT

## 2025-07-08 RX ORDER — BACITRACIN 500 UNIT/G
1 OINTMENT (GRAM) TOPICAL
Qty: 1 | Refills: 0
Start: 2025-07-08 | End: 2025-07-14

## 2025-07-08 NOTE — ED PROVIDER NOTE - ATTENDING APP SHARED VISIT CONTRIBUTION OF CARE
43 y/o M presents for leg wound to right anterior leg after a  injury 2 days ago - has been putting Neosporin on with surrounding erythema. Notes edema of the calf as well.    NAD, mild edema in anterior midshin with erythema, no induration, no purulent discharge, no crepitus to palpation, patient ambulating.    XR shows no free air tracking, will give bacitracin, advised avoiding neosporin.

## 2025-07-08 NOTE — ED ADULT NURSE NOTE - OBJECTIVE STATEMENT
Patient comes to ED c/o wound on right shin that the patient noticed after power washing on Sunday. Patient suspects the power wash hit his right shin. Pt states the wound has been getting progressively worse. respirations even and unlabored. pt shows no s/s of acute distress at this time. safety precautions maintained.

## 2025-07-08 NOTE — ED PROVIDER NOTE - CLINICAL SUMMARY MEDICAL DECISION MAKING FREE TEXT BOX
43 yo M Hx DM, cardiac stent presents to ED c/o wound to right lower leg from power washing 2 days ago. has been cleaning area with hydrogen peroxide and applying neosporin. Pt states now some redness around the area and noted discharge today. Went to urgent care, sent to ED. No fevers. 3cm linea rwound right anterior shin minimal erythema around area, mild swelling. xray no subcutaneous air. advised to stop use of neosporin. apply bacitracin to area instead. f/u pcp. return precautions.

## 2025-07-08 NOTE — ED ADULT NURSE NOTE - NSFALLUNIVINTERV_ED_ALL_ED
Bed/Stretcher in lowest position, wheels locked, appropriate side rails in place/Call bell, personal items and telephone in reach/Instruct patient to call for assistance before getting out of bed/chair/stretcher/Non-slip footwear applied when patient is off stretcher/[place] to call system/Physically safe environment - no spills, clutter or unnecessary equipment/Purposeful proactive rounding/Room/bathroom lighting operational, light cord in reach

## 2025-07-08 NOTE — ED PROVIDER NOTE - PHYSICAL EXAMINATION
Gen: No acute distress, non toxic  HEENT: Mucous membranes moist, pink conjunctivae, EOMI  Neuro: A&O x 3, moving all 4 extremities  MSK: Approx 3cm linear open wound right anterior shin, minimal surrounding erythema and mild swelling, no purulent discharge. Full ROM extremity   Skin: No rashes. intact and perfused.

## 2025-07-08 NOTE — ED PROVIDER NOTE - NSFOLLOWUPINSTRUCTIONS_ED_ALL_ED_FT
Please apply bacitracin to oz as directed  Change dressing twice daily  Stop use of neosporin  May take ibuprofen or tylenol as needed for pain  Please follow up with primary care doctor in 2-3 days  Return to ER for any new or worsening symptoms

## 2025-07-08 NOTE — ED PROVIDER NOTE - OBJECTIVE STATEMENT
45 yo M Hx DM, cardiac stent presents to ED c/o wound to right lower leg. Pt was power washing 2 days ago sprayed leg by accident sustaining a open wound. has been cleaning area with hydrogen peroxide and applying neosporin. Pt states now some redness around the area and noted discharge today. Went to urgent care, sent to ED. Denies fever, chills.

## 2025-07-08 NOTE — ED PROVIDER NOTE - PATIENT PORTAL LINK FT
You can access the FollowMyHealth Patient Portal offered by [facility] by registering at the following website: [email or web address]. By joining [facility]’s FollowMyHealth portal, you will also be able to view your health information using other applications (apps) compatible with our system.

## 2025-08-19 ENCOUNTER — APPOINTMENT (OUTPATIENT)
Dept: CARDIOLOGY | Facility: CLINIC | Age: 45
End: 2025-08-19
Payer: COMMERCIAL

## 2025-08-19 ENCOUNTER — LABORATORY RESULT (OUTPATIENT)
Age: 45
End: 2025-08-19

## 2025-08-19 VITALS
OXYGEN SATURATION: 96 % | BODY MASS INDEX: 44.94 KG/M2 | HEART RATE: 70 BPM | SYSTOLIC BLOOD PRESSURE: 138 MMHG | DIASTOLIC BLOOD PRESSURE: 86 MMHG | RESPIRATION RATE: 13 BRPM | WEIGHT: 315 LBS

## 2025-08-19 DIAGNOSIS — E66.9 OBESITY, UNSPECIFIED: ICD-10-CM

## 2025-08-19 DIAGNOSIS — E78.5 HYPERLIPIDEMIA, UNSPECIFIED: ICD-10-CM

## 2025-08-19 DIAGNOSIS — Z95.5 PRESENCE OF CORONARY ANGIOPLASTY IMPLANT AND GRAFT: ICD-10-CM

## 2025-08-19 DIAGNOSIS — I25.10 ATHEROSCLEROTIC HEART DISEASE OF NATIVE CORONARY ARTERY W/OUT ANGINA PECTORIS: ICD-10-CM

## 2025-08-19 PROCEDURE — 93000 ELECTROCARDIOGRAM COMPLETE: CPT

## 2025-08-19 PROCEDURE — 99214 OFFICE O/P EST MOD 30 MIN: CPT

## 2025-08-19 PROCEDURE — G2211 COMPLEX E/M VISIT ADD ON: CPT

## 2025-08-19 RX ORDER — TIRZEPATIDE 2.5 MG/.5ML
2.5 INJECTION, SOLUTION SUBCUTANEOUS
Qty: 4 | Refills: 2 | Status: ACTIVE | COMMUNITY
Start: 2025-08-19 | End: 1900-01-01

## 2025-08-20 ENCOUNTER — NON-APPOINTMENT (OUTPATIENT)
Age: 45
End: 2025-08-20

## 2025-08-26 ENCOUNTER — RX CHANGE (OUTPATIENT)
Age: 45
End: 2025-08-26

## 2025-09-12 ENCOUNTER — NON-APPOINTMENT (OUTPATIENT)
Age: 45
End: 2025-09-12